# Patient Record
Sex: FEMALE | Race: WHITE | Employment: OTHER | URBAN - METROPOLITAN AREA
[De-identification: names, ages, dates, MRNs, and addresses within clinical notes are randomized per-mention and may not be internally consistent; named-entity substitution may affect disease eponyms.]

---

## 2017-03-01 ENCOUNTER — HOSPITAL ENCOUNTER (OUTPATIENT)
Dept: MAMMOGRAPHY | Age: 62
Discharge: HOME | End: 2017-03-01
Attending: FAMILY MEDICINE
Payer: COMMERCIAL

## 2017-03-01 DIAGNOSIS — Z12.31 VISIT FOR SCREENING MAMMOGRAM: ICD-10-CM

## 2017-03-01 PROCEDURE — 77067 SCR MAMMO BI INCL CAD: CPT

## 2018-03-20 ENCOUNTER — HOSPITAL ENCOUNTER (OUTPATIENT)
Dept: MAMMOGRAPHY | Age: 63
Discharge: HOME | End: 2018-03-20
Attending: FAMILY MEDICINE
Payer: COMMERCIAL

## 2018-03-20 DIAGNOSIS — Z12.39 BREAST SCREENING: ICD-10-CM

## 2018-03-20 PROCEDURE — 77067 SCR MAMMO BI INCL CAD: CPT

## 2018-03-21 ENCOUNTER — HOSPITAL ENCOUNTER (OUTPATIENT)
Dept: MAMMOGRAPHY | Age: 63
Discharge: HOME | End: 2018-03-21
Attending: FAMILY MEDICINE
Payer: COMMERCIAL

## 2018-03-21 DIAGNOSIS — R92.8 ABNORMAL MAMMOGRAM OF LEFT BREAST: ICD-10-CM

## 2018-03-21 PROCEDURE — 77065 DX MAMMO INCL CAD UNI: CPT

## 2018-08-27 ENCOUNTER — OFFICE VISIT (OUTPATIENT)
Dept: INTERNAL MEDICINE CLINIC | Age: 63
End: 2018-08-27

## 2018-08-27 VITALS
BODY MASS INDEX: 23.66 KG/M2 | HEART RATE: 70 BPM | DIASTOLIC BLOOD PRESSURE: 68 MMHG | HEIGHT: 64 IN | TEMPERATURE: 98 F | WEIGHT: 138.6 LBS | SYSTOLIC BLOOD PRESSURE: 134 MMHG | RESPIRATION RATE: 18 BRPM | OXYGEN SATURATION: 97 %

## 2018-08-27 DIAGNOSIS — I10 ESSENTIAL HYPERTENSION: Primary | ICD-10-CM

## 2018-08-27 DIAGNOSIS — M81.0 AGE-RELATED OSTEOPOROSIS WITHOUT CURRENT PATHOLOGICAL FRACTURE: ICD-10-CM

## 2018-08-27 RX ORDER — RISEDRONATE SODIUM 150 MG/1
75 TABLET, FILM COATED ORAL
COMMUNITY
Start: 2018-03-22 | End: 2020-10-28 | Stop reason: SDUPTHER

## 2018-08-27 RX ORDER — ESTRADIOL 0.1 MG/G
CREAM VAGINAL
COMMUNITY
Start: 2018-03-06 | End: 2021-03-18 | Stop reason: ALTCHOICE

## 2018-08-27 RX ORDER — LOSARTAN POTASSIUM 100 MG/1
100 TABLET ORAL DAILY
Qty: 90 TAB | Refills: 3 | Status: SHIPPED | OUTPATIENT
Start: 2018-08-27 | End: 2019-08-05 | Stop reason: SDUPTHER

## 2018-08-27 NOTE — MR AVS SNAPSHOT
[address] [place] [address] [address] [facility] 57 
[phone] Patient: Leo Hermosillo MRN: [id number] CVW:[id number] Visit Information Date & Time Provider Department Dept. Phone Encounter #  
 8/27/2018 10:40 AM Karel Urban MD [facility] [phone] [id number] Upcoming Health Maintenance Date Due Hepatitis C Screening 1955 COLONOSCOPY 7/12/1972 DTaP/Tdap/Td series (1 - Tdap) 7/12/1975 PAP AKA CERVICAL CYTOLOGY 7/12/1975 ZOSTER VACCINE AGE 60> 5/12/2014 Influenza Age 5 to Adult 8/1/2018 BREAST CANCER SCRN MAMMOGRAM 3/21/2020 Allergies as of 8/27/2018  Review Complete On: 8/27/2018 By: Karel Urban MD  
 No Known Allergies Current Immunizations  Never Reviewed No immunizations on file. Not reviewed this visit You Were Diagnosed With   
  
 Codes Comments Essential hypertension    -  Primary ICD-10-CM: I10 
ICD-9-CM: 401.9 Age-related osteoporosis without current pathological fracture     ICD-10-CM: M81.0 ICD-9-CM: 733.01 Vitals BP Pulse Temp Resp Height(growth percentile) Weight(growth percentile) 134/68 (BP 1 Location: Left arm, BP Patient Position: At rest) 70 98 °F (36.7 °C) (Oral) 18 5' 4\" (1.626 m) 138 lb 9.6 oz (62.9 kg) SpO2 BMI OB Status Smoking Status 97% 23.79 kg/m2 Postmenopausal Never Smoker BMI and BSA Data Body Mass Index Body Surface Area  
 23.79 kg/m 2 1.69 m 2 Preferred Pharmacy Pharmacy Name Phone Julia Haywood, [facility] [phone] Your Updated Medication List  
  
   
This list is accurate as of 8/27/18 11:42 AM.  Always use your most recent med list.  
  
  
  
  
 ESTRACE 0.01 % (0.1 mg/gram) vaginal cream  
Generic drug:  estradiol  
  
 losartan 100 mg tablet Commonly known as:  COZAAR Take 1 Tab by mouth daily. risedronate 150 mg tablet Commonly known as:  ACTONEL  
  
 varicella-zoster recombinant (PF) 50 mcg/0.5 mL Susr injection Commonly known as:  SHINGRIX (PF)  
0.5 mL by IntraMUSCular route once for 1 dose. Prescriptions Printed Refills  
 varicella-zoster recombinant, PF, (SHINGRIX, PF,) 50 mcg/0.5 mL susr injection 1 Si.5 mL by IntraMUSCular route once for 1 dose. Class: Print Route: IntraMUSCular Prescriptions Sent to Pharmacy Refills  
 losartan (COZAAR) 100 mg tablet 3 Sig: Take 1 Tab by mouth daily. Class: Normal  
 Pharmacy: [address] [facility], [address] [place] [address] #: [phone] Route: Oral  
  
We Performed the Following LIPID PANEL [07666 CPT(R)] METABOLIC PANEL, COMPREHENSIVE [60837 CPT(R)] Introducing [address] Dear Christy Mak: Thank you for requesting a [employer] account. Our records indicate that you already have an active DataPad account. You can access your account anytime at [email or web address] Did you know that you can access your hospital and ER discharge instructions at any time in [employer]Pad? You can also review all of your test results from your hospital stay or ER visit. Additional Information If you have questions, please visit the Frequently Asked Questions section of the Data[employer] website at [email or web address] Remember, DataPad is NOT to be used for urgent needs. For medical emergencies, dial 911. Now available from your iPhone and Android! Please provide this summary of care documentation to your next provider. Your primary care clinician is listed as RHONA EVANS. If you have any questions after today's visit, please call [phone].

## 2018-08-27 NOTE — PROGRESS NOTES
HISTORY OF PRESENT ILLNESS  Ronnette Kawasaki is a 58 y.o. female. HPI  New patient to our practice. Hx of htn. Has been on meds since . Currently on Valsartan. Hx of osteoporosis. Saw Dr. Johnie Stacy (gyn). Off and on for 3 years but has been taking regularly since . Had DXA in July. UTD with pap and mammogram.    Treated for Hep C with interferon and ribovirin (expiremental treatment). Cured.  battled brain cancer x 14 years.  6 years ago this December. Taking xanax during that time. A lot of DYI. Orthopedic issues secondary. Recent Mortons Neuroma and Trigger fingers. Had colonoscopy completed within last 5 years at [facility]  Failed stress test and had nml cardiac cath by Dr. Meera Henry. Exercises and walks daily. Past Medical History:   Diagnosis Date    Hep C w/o coma, chronic (HCC)     treated    Hypertension     Osteoporosis      Past Surgical History:   Procedure Laterality Date    HX CARPAL TUNNEL RELEASE      x2    HX  SECTION      x3    HX CYST INCISION AND DRAINAGE Left 7984-0109    bemign     Family History   Problem Relation Age of Onset    Diabetes Mother     Heart Disease Mother      CHF    Heart Disease Father     Hypertension Father     Heart Disease Brother     Heart Disease Paternal Uncle     Hypertension Brother      Social History     Social History    Marital status:      Spouse name: N/A    Number of children: N/A    Years of education: N/A     Occupational History    Not on file.      Social History Main Topics    Smoking status: Never Smoker    Smokeless tobacco: Never Used    Alcohol use 4.2 oz/week     7 Glasses of wine per week    Drug use: No    Sexual activity: No     Other Topics Concern    Not on file     Social History Narrative       Current Outpatient Prescriptions:     risedronate (ACTONEL) 150 mg tablet, , Disp: , Rfl:     estradiol (ESTRACE) 0.01 % (0.1 mg/gram) vaginal cream, , Disp: , Rfl:    losartan (COZAAR) 100 mg tablet, Take 1 Tab by mouth daily. , Disp: 90 Tab, Rfl: 3    varicella-zoster recombinant, PF, (SHINGRIX, PF,) 50 mcg/0.5 mL susr injection, 0.5 mL by IntraMUSCular route once for 1 dose., Disp: 0.5 mL, Rfl: 1      Visit Vitals    /68 (BP 1 Location: Left arm, BP Patient Position: At rest)    Pulse 70    Temp 98 °F (36.7 °C) (Oral)    Resp 18    Ht 5' 4\" (1.626 m)    Wt 138 lb 9.6 oz (62.9 kg)    SpO2 97%    BMI 23.79 kg/m2       ROS  See above  Physical Exam   Constitutional: She appears well-developed and well-nourished. HENT:   Head: Normocephalic and atraumatic. Neck: Neck supple. No thyromegaly present. Cardiovascular: Normal rate, regular rhythm and normal heart sounds. Exam reveals no gallop and no friction rub. No murmur heard. Pulmonary/Chest: Effort normal and breath sounds normal.   Abdominal: Soft. Bowel sounds are normal. She exhibits no distension and no mass. There is no tenderness. Musculoskeletal: She exhibits no edema. Lymphadenopathy:     She has no cervical adenopathy. Vitals reviewed. ASSESSMENT and PLAN  htn - controlled. Needs to change from valsartan. Will start losartan. Will monitor at home. Check labs  Osteoporosis - followed by Dr. Sang Morrissey. Continue Alendronate  HM - Shingrix.   Tetanus 2014 in [address] [place] [address] This Encounter    METABOLIC PANEL, COMPREHENSIVE    LIPID PANEL    risedronate (ACTONEL) 150 mg tablet    estradiol (ESTRACE) 0.01 % (0.1 mg/gram) vaginal cream    losartan (COZAAR) 100 mg tablet    varicella-zoster recombinant, PF, (SHINGRIX, PF,) 50 mcg/0.5 mL susr injection     Follow-up Disposition: Not on File

## 2018-09-21 LAB
ALBUMIN SERPL-MCNC: 4.1 G/DL (ref 3.6–4.8)
ALBUMIN/GLOB SERPL: 1.8 {RATIO} (ref 1.2–2.2)
ALP SERPL-CCNC: 46 IU/L (ref 39–117)
ALT SERPL-CCNC: 9 IU/L (ref 0–32)
AST SERPL-CCNC: 14 IU/L (ref 0–40)
BILIRUB SERPL-MCNC: 0.3 MG/DL (ref 0–1.2)
BUN SERPL-MCNC: 12 MG/DL (ref 8–27)
BUN/CREAT SERPL: 21 (ref 12–28)
CALCIUM SERPL-MCNC: 8.8 MG/DL (ref 8.7–10.3)
CHLORIDE SERPL-SCNC: 106 MMOL/L (ref 96–106)
CHOLEST SERPL-MCNC: 237 MG/DL (ref 100–199)
CO2 SERPL-SCNC: 23 MMOL/L (ref 20–29)
CREAT SERPL-MCNC: 0.57 MG/DL (ref 0.57–1)
GLOBULIN SER CALC-MCNC: 2.3 G/DL (ref 1.5–4.5)
GLUCOSE SERPL-MCNC: 100 MG/DL (ref 65–99)
HDLC SERPL-MCNC: 74 MG/DL
INTERPRETATION, 910389: NORMAL
LDLC SERPL CALC-MCNC: 153 MG/DL (ref 0–99)
POTASSIUM SERPL-SCNC: 4.2 MMOL/L (ref 3.5–5.2)
PROT SERPL-MCNC: 6.4 G/DL (ref 6–8.5)
SODIUM SERPL-SCNC: 143 MMOL/L (ref 134–144)
TRIGL SERPL-MCNC: 51 MG/DL (ref 0–149)
VLDLC SERPL CALC-MCNC: 10 MG/DL (ref 5–40)

## 2019-05-16 ENCOUNTER — HOSPITAL ENCOUNTER (OUTPATIENT)
Dept: MAMMOGRAPHY | Age: 64
Discharge: HOME | End: 2019-05-16
Attending: INTERNAL MEDICINE
Payer: COMMERCIAL

## 2019-05-16 DIAGNOSIS — Z12.39 BREAST SCREENING, UNSPECIFIED: ICD-10-CM

## 2019-05-16 PROCEDURE — 77063 BREAST TOMOSYNTHESIS BI: CPT

## 2019-08-27 ENCOUNTER — OFFICE VISIT (OUTPATIENT)
Dept: INTERNAL MEDICINE CLINIC | Age: 64
End: 2019-08-27

## 2019-08-27 VITALS
OXYGEN SATURATION: 98 % | HEIGHT: 64 IN | WEIGHT: 138 LBS | TEMPERATURE: 98.1 F | DIASTOLIC BLOOD PRESSURE: 78 MMHG | SYSTOLIC BLOOD PRESSURE: 130 MMHG | BODY MASS INDEX: 23.56 KG/M2 | HEART RATE: 84 BPM

## 2019-08-27 DIAGNOSIS — I10 ESSENTIAL HYPERTENSION: Primary | ICD-10-CM

## 2019-08-27 DIAGNOSIS — E78.00 PURE HYPERCHOLESTEROLEMIA: ICD-10-CM

## 2019-08-27 RX ORDER — LOSARTAN POTASSIUM 100 MG/1
TABLET ORAL
Qty: 90 TAB | Refills: 3 | Status: SHIPPED | OUTPATIENT
Start: 2019-08-27 | End: 2020-08-14

## 2019-08-27 NOTE — PROGRESS NOTES
Subjective:     Cecy Sawant is a 71 y.o. female who presents for follow up of hypertension. Diet and Lifestyle: generally follows a low sodium diet, exercises regularly  Home BP Monitoring: is well controlled at home, ranging 130's/80's    Cardiovascular ROS: taking medications as instructed, no medication side effects noted, no TIA's, no chest pain on exertion, notes stable dyspnea on exertion, no change, no swelling of ankles, no orthostatic dizziness or lightheadedness, no palpitations. New concerns: none  Continues to see Dr. Johnie Stacy for osteoporosis. Due for repeat DXA next year. Continues on Actonel. .   Intermittent right hip pain. Current Outpatient Medications   Medication Sig Dispense Refill    ibuprofen (MOTRIN PO) Take  by mouth. As needed      losartan (COZAAR) 100 mg tablet TAKE 1 TABLET DAILY 90 Tab 0    risedronate (ACTONEL) 150 mg tablet Take 75 mg by mouth every thirty (30) days.  estradiol (ESTRACE) 0.01 % (0.1 mg/gram) vaginal cream                Review of Systems, additional:  Pertinent items are noted in HPI. Objective:     Visit Vitals  /78   Pulse 84   Temp 98.1 °F (36.7 °C) (Oral)   Ht 5' 4\" (1.626 m)   Wt 138 lb (62.6 kg)   SpO2 98%   BMI 23.69 kg/m²     Appearance: alert, well appearing, and in no distress and oriented to person, place, and time. General exam:   NECK: supple, no lad, no bruit, no tm  LUNGS: cta bilat  CV rrr, no m/g/r  ABD: soft, nt, nd, nabs  EXT: no c/c/e    Assessment/Plan:     hypertension well controlled. current treatment plan is effective, no change in therapy. ? Coverage with losartan  Check labs. Osteoporosis - f/u with Dr. Johnie Stacy. Continue current medications. Orders Placed This Encounter    METABOLIC PANEL, COMPREHENSIVE    LIPID PANEL    ibuprofen (MOTRIN PO)    losartan (COZAAR) 100 mg tablet     Follow-up and Dispositions    · Return in about 6 months (around 2/27/2020) for welcome to medicare.

## 2019-08-27 NOTE — PROGRESS NOTES
Chief Complaint   Patient presents with    Follow-up     yearly visit     Visit Vitals  BP (!) 150/104   Pulse 84   Temp 98.1 °F (36.7 °C) (Oral)   Ht 5' 4\" (1.626 m)   Wt 138 lb (62.6 kg)   SpO2 98%   BMI 23.69 kg/m²     1. Have you been to the ER, urgent care clinic since your last visit? Hospitalized since your last visit? Yes,     2. Have you seen or consulted any other health care providers outside of the [address] [place] [address] since your last visit? Include any pap smears or colon screening.  no

## 2020-02-13 ENCOUNTER — HOSPITAL ENCOUNTER (OUTPATIENT)
Dept: LAB | Age: 65
Discharge: HOME | End: 2020-02-13
Payer: MEDICARE

## 2020-02-13 PROCEDURE — 80061 LIPID PANEL: CPT

## 2020-02-13 PROCEDURE — 36415 COLL VENOUS BLD VENIPUNCTURE: CPT

## 2020-02-13 PROCEDURE — 80053 COMPREHEN METABOLIC PANEL: CPT

## 2020-02-14 LAB
ALBUMIN SERPL-MCNC: 4.4 G/DL (ref 3.8–4.8)
ALBUMIN/GLOB SERPL: 2.1 {RATIO} (ref 1.2–2.2)
ALP SERPL-CCNC: 40 IU/L (ref 39–117)
ALT SERPL-CCNC: 15 IU/L (ref 0–32)
AST SERPL-CCNC: 12 IU/L (ref 0–40)
BILIRUB SERPL-MCNC: 0.3 MG/DL (ref 0–1.2)
BUN SERPL-MCNC: 13 MG/DL (ref 8–27)
BUN/CREAT SERPL: 21 (ref 12–28)
CALCIUM SERPL-MCNC: 9.3 MG/DL (ref 8.7–10.3)
CHLORIDE SERPL-SCNC: 103 MMOL/L (ref 96–106)
CHOLEST SERPL-MCNC: 285 MG/DL (ref 100–199)
CO2 SERPL-SCNC: 25 MMOL/L (ref 20–29)
CREAT SERPL-MCNC: 0.63 MG/DL (ref 0.57–1)
GLOBULIN SER CALC-MCNC: 2.1 G/DL (ref 1.5–4.5)
GLUCOSE SERPL-MCNC: 89 MG/DL (ref 65–99)
HDLC SERPL-MCNC: 93 MG/DL
INTERPRETATION, 910389: NORMAL
LDLC SERPL CALC-MCNC: 177 MG/DL (ref 0–99)
POTASSIUM SERPL-SCNC: 4.6 MMOL/L (ref 3.5–5.2)
PROT SERPL-MCNC: 6.5 G/DL (ref 6–8.5)
SODIUM SERPL-SCNC: 142 MMOL/L (ref 134–144)
TRIGL SERPL-MCNC: 76 MG/DL (ref 0–149)
VLDLC SERPL CALC-MCNC: 15 MG/DL (ref 5–40)

## 2020-02-26 ENCOUNTER — OFFICE VISIT (OUTPATIENT)
Dept: INTERNAL MEDICINE CLINIC | Age: 65
End: 2020-02-26

## 2020-02-26 VITALS
TEMPERATURE: 97.8 F | SYSTOLIC BLOOD PRESSURE: 116 MMHG | WEIGHT: 140 LBS | DIASTOLIC BLOOD PRESSURE: 72 MMHG | HEART RATE: 88 BPM | BODY MASS INDEX: 23.9 KG/M2 | OXYGEN SATURATION: 99 % | HEIGHT: 64 IN

## 2020-02-26 DIAGNOSIS — E78.00 PURE HYPERCHOLESTEROLEMIA: ICD-10-CM

## 2020-02-26 DIAGNOSIS — I10 ESSENTIAL HYPERTENSION: ICD-10-CM

## 2020-02-26 DIAGNOSIS — Z71.89 ADVANCE DIRECTIVE DISCUSSED WITH PATIENT: ICD-10-CM

## 2020-02-26 DIAGNOSIS — M81.0 AGE-RELATED OSTEOPOROSIS WITHOUT CURRENT PATHOLOGICAL FRACTURE: ICD-10-CM

## 2020-02-26 DIAGNOSIS — Z00.00 WELCOME TO MEDICARE PREVENTIVE VISIT: Primary | ICD-10-CM

## 2020-02-26 NOTE — PATIENT INSTRUCTIONS
Medicare Wellness Visit, Female The best way to live healthy is to have a lifestyle where you eat a well-balanced diet, exercise regularly, limit alcohol use, and quit all forms of tobacco/nicotine, if applicable. Regular preventive services are another way to keep healthy. Preventive services (vaccines, screening tests, monitoring & exams) can help personalize your care plan, which helps you manage your own care. Screening tests can find health problems at the earliest stages, when they are easiest to treat. [facility] follows the current, evidence-based guidelines published by the [facility] Gregory Steven ([facility]) when recommending preventive services for our patients. Because we follow these guidelines, sometimes recommendations change over time as research supports it. (For example, mammograms used to be recommended annually. Even though Medicare will still pay for an annual mammogram, the newer guidelines recommend a mammogram every two years for women of average risk). Of course, you and your doctor may decide to screen more often for some diseases, based on your risk and your co-morbidities (chronic disease you are already diagnosed with). Preventive services for you include: - Medicare offers their members a free annual wellness visit, which is time for you and your primary care provider to discuss and plan for your preventive service needs. Take advantage of this benefit every year! 
-All adults over the age of 72 should receive the recommended pneumonia vaccines. Current USPSTF guidelines recommend a series of two vaccines for the best pneumonia protection.  
-All adults should have a flu vaccine yearly and a tetanus vaccine every 10 years.  
-All adults age 48 and older should receive the shingles vaccines (series of two vaccines). -All adults age 38-68 who are overweight should have a diabetes screening test once every three years. -All adults born between 80 and 1965 should be screened once for Hepatitis C. 
-Other screening tests and preventive services for persons with diabetes include: an eye exam to screen for diabetic retinopathy, a kidney function test, a foot exam, and stricter control over your cholesterol.  
-Cardiovascular screening for adults with routine risk involves an electrocardiogram (ECG) at intervals determined by your doctor.  
-Colorectal cancer screenings should be done for adults age 54-65 with no increased risk factors for colorectal cancer. There are a number of acceptable methods of screening for this type of cancer. Each test has its own benefits and drawbacks. Discuss with your doctor what is most appropriate for you during your annual wellness visit. The different tests include: colonoscopy (considered the best screening method), a fecal occult blood test, a fecal DNA test, and sigmoidoscopy. 
 
-A bone mass density test is recommended when a woman turns 65 to screen for osteoporosis. This test is only recommended one time, as a screening. Some providers will use this same test as a disease monitoring tool if you already have osteoporosis. -Breast cancer screenings are recommended every other year for women of normal risk, age 54-69. 
-Cervical cancer screenings for women over age 72 are only recommended with certain risk factors. Here is a list of your current Health Maintenance items (your personalized list of preventive services) with a due date: 
 
 
Health Maintenance Due Topic Date Due  
 Colonoscopy  01/01/2020  Shingrix Vaccine Age 50> (1 of 2) Prescription given  GLAUCOMA SCREENING Q2Y  Due  
 Medicare Yearly Exam  02/26/2021 Medicare Wellness Visit, Female The best way to live healthy is to have a lifestyle where you eat a well-balanced diet, exercise regularly, limit alcohol use, and quit all forms of tobacco/nicotine, if applicable. Regular preventive services are another way to keep healthy. Preventive services (vaccines, screening tests, monitoring & exams) can help personalize your care plan, which helps you manage your own care. Screening tests can find health problems at the earliest stages, when they are easiest to treat. [facility] follows the current, evidence-based guidelines published by the [facility] Gregory Steven ([facility]) when recommending preventive services for our patients. Because we follow these guidelines, sometimes recommendations change over time as research supports it. (For example, mammograms used to be recommended annually. Even though Medicare will still pay for an annual mammogram, the newer guidelines recommend a mammogram every two years for women of average risk). Of course, you and your doctor may decide to screen more often for some diseases, based on your risk and your co-morbidities (chronic disease you are already diagnosed with). Preventive services for you include: - Medicare offers their members a free annual wellness visit, which is time for you and your primary care provider to discuss and plan for your preventive service needs. Take advantage of this benefit every year! 
-All adults over the age of 72 should receive the recommended pneumonia vaccines. Current USPSTF guidelines recommend a series of two vaccines for the best pneumonia protection.  
-All adults should have a flu vaccine yearly and a tetanus vaccine every 10 years.  
-All adults age 48 and older should receive the shingles vaccines (series of two vaccines).      
-All adults age 38-68 who are overweight should have a diabetes screening test once every three years.  
-All adults born between 80 and 1965 should be screened once for Hepatitis C. 
-Other screening tests and preventive services for persons with diabetes include: an eye exam to screen for diabetic retinopathy, a kidney function test, a foot exam, and stricter control over your cholesterol.  
-Cardiovascular screening for adults with routine risk involves an electrocardiogram (ECG) at intervals determined by your doctor.  
-Colorectal cancer screenings should be done for adults age 54-65 with no increased risk factors for colorectal cancer. There are a number of acceptable methods of screening for this type of cancer. Each test has its own benefits and drawbacks. Discuss with your doctor what is most appropriate for you during your annual wellness visit. The different tests include: colonoscopy (considered the best screening method), a fecal occult blood test, a fecal DNA test, and sigmoidoscopy. 
 
-A bone mass density test is recommended when a woman turns 65 to screen for osteoporosis. This test is only recommended one time, as a screening. Some providers will use this same test as a disease monitoring tool if you already have osteoporosis. -Breast cancer screenings are recommended every other year for women of normal risk, age 54-69. 
-Cervical cancer screenings for women over age 72 are only recommended with certain risk factors. Here is a list of your current Health Maintenance items (your personalized list of preventive services) with a due date: 
Health Maintenance Due Topic Date Due  
 Colonoscopy  07/12/1972  Shingles Vaccine (1 of 2) 07/12/2004  Glaucoma Screening   07/12/2019

## 2020-02-26 NOTE — PROGRESS NOTES
This is a \"Welcome to [employer]"  Initial Preventive Physical Examination (IPPE) providing Personalized Prevention Plan Services (Performed in the first 12 months of enrollment)    I have reviewed the patient's medical history in detail and updated the computerized patient record. HTN - BP well controlled at home. Taking losartan regularly. 110/74 last week at home. Dr. Peggye Krabbe at [facility] is following her DXAn for osteoporosis. .  Remains on Actonel. History     Past Medical History:   Diagnosis Date    Hep C w/o coma, chronic (HCC)     treated    Hypertension     Osteoporosis       Past Surgical History:   Procedure Laterality Date    HX CARPAL TUNNEL RELEASE      x2    HX  SECTION      x3    HX CYST INCISION AND DRAINAGE Left 4810-6706    bemign     Current Outpatient Medications   Medication Sig Dispense Refill    varicella-zoster recombinant, PF, (SHINGRIX, PF,) 50 mcg/0.5 mL susr injection 0.5mL by IntraMUSCular route once now and then repeat in 2-6 months 0.5 mL 1    ibuprofen (MOTRIN PO) Take  by mouth. As needed      losartan (COZAAR) 100 mg tablet TAKE 1 TABLET DAILY 90 Tab 3    risedronate (ACTONEL) 150 mg tablet Take 75 mg by mouth every thirty (30) days.  estradiol (ESTRACE) 0.01 % (0.1 mg/gram) vaginal cream        No Known Allergies    Family History   Problem Relation Age of Onset    Diabetes Mother     Heart Disease Mother         CHF    Heart Disease Father     Hypertension Father     Heart Disease Brother     Heart Disease Paternal Uncle     Hypertension Brother      Social History     Tobacco Use    Smoking status: Never Smoker    Smokeless tobacco: Never Used   Substance Use Topics    Alcohol use:  Yes     Alcohol/week: 7.0 standard drinks     Types: 7 Glasses of wine per week       Depression Risk Screen     3 most recent PHQ Screens 2020   Little interest or pleasure in doing things Not at all   Feeling down, depressed, irritable, or hopeless Not at all   Total Score PHQ 2 0       Alcohol Risk Factor Screening:   Do you average 1 drink per night or more than 7 drinks a week:  No    On any one occasion in the past three months have you have had more than 3 drinks containing alcohol:  No      Functional Ability and Level of Safety   Diet: No special diet    Hearing: Hearing is good. Vision Screening:  Vision is good. Using readers. UTD with eye exam.    No exam data present      Activities of Daily Living: The home contains: no safety equipment. Patient does total self care    Ambulation: with no difficulty    Exercise level: moderately active    Fall Risk Screen:  Fall Risk Assessment, last 12 mths 2/26/2020   Able to walk? Yes   Fall in past 12 months? Yes   Fall with injury? Yes   Number of falls in past 12 months 1   Fall Risk Score 2       Abuse Screen:  Patient is not abused     ROS:   Negative except:  Mild aches and pains in her joints. Sees dermatologist bc hx of axillary basal cell ca.       Physical Examination:  Visit Vitals  /72   Pulse 88   Temp 97.8 °F (36.6 °C) (Oral)   Ht 5' 4\" (1.626 m)   Wt 140 lb (63.5 kg)   SpO2 99%   BMI 24.03 kg/m²     General appearance - alert, well appearing, and in no distress and oriented to person, place, and time  Mental status - alert, oriented to person, place, and time  Eyes - pupils equal and reactive, extraocular eye movements intact  Ears - bilateral TM's and external ear canals normal  Nose - normal and patent, no erythema, discharge or polyps  Mouth - mucous membranes moist, pharynx normal without lesions  Neck - supple, no significant adenopathy, carotids upstroke normal bilaterally, no bruits, thyroid exam: thyroid is normal in size without nodules or tenderness  Lymphatics - no palpable lymphadenopathy, no hepatosplenomegaly  Chest - clear to auscultation, no wheezes, rales or rhonchi, symmetric air entry  Heart - normal rate, regular rhythm, normal S1, S2, no murmurs, rubs, clicks or gallops  Abdomen - soft, nontender, nondistended, no masses or organomegaly  bowel sounds normal  Breasts - not examined  Back exam - full range of motion, no tenderness, palpable spasm or pain on motion  Neurological - alert, oriented, normal speech, no focal findings or movement disorder noted  Musculoskeletal - no joint tenderness, deformity or swelling  Extremities - peripheral pulses normal, no pedal edema, no clubbing or cyanosis  Skin - normal coloration and turgor, no rashes, no suspicious skin lesions noted    Screening EKG   EKG order placed: Yes  - NSR    Patient Care Team   Patient Care Team:  Malik Griffin MD as PCP - General (Internal Medicine)  Malik Griffin [facility]     End of Life Planning   Advanced care planning directives were discussed with the patient and /or family/caregiver. Assessment/Plan   Education and counseling provided:  Are appropriate based on today's review and evaluation    Diagnoses and all orders for this visit:    1. Welcome to Medicare preventive visit  -     varicella-zoster recombinant, PF, (SHINGRIX, PF,) 50 mcg/0.5 mL susr injection; 0.5mL by IntraMUSCular route once now and then repeat in 2-6 months    2. Essential hypertension -    Controlled, cont same    3. Pure hypercholesterolemia   Elevated but pt does not want to start statin   Discussed improved diet,  Continue exercise. 4. Age-related osteoporosis without current pathological fracture   Continue Actonel. Fu DXA through Dr. Billy Pardo.      5. Advance directive discussed with patient        Health Maintenance Due   Topic Date Due    Colonoscopy  01/01/2020    Shingrix Vaccine Age 50> (1 of 2) Prescription given    GLAUCOMA SCREENING Q2Y  Due    Medicare Yearly Exam  02/26/2021

## 2020-08-14 RX ORDER — LOSARTAN POTASSIUM 100 MG/1
TABLET ORAL
Qty: 90 TAB | Refills: 3 | Status: SHIPPED | OUTPATIENT
Start: 2020-08-14 | End: 2021-08-09

## 2020-09-29 ENCOUNTER — HOSPITAL ENCOUNTER (OUTPATIENT)
Dept: MAMMOGRAPHY | Age: 65
Discharge: HOME | End: 2020-09-29
Attending: INTERNAL MEDICINE
Payer: MEDICARE

## 2020-09-29 DIAGNOSIS — Z12.31 SCREENING MAMMOGRAM FOR HIGH-RISK PATIENT: ICD-10-CM

## 2020-09-29 PROCEDURE — 77063 BREAST TOMOSYNTHESIS BI: CPT

## 2020-10-26 NOTE — TELEPHONE ENCOUNTER
Patient says her GYN has closed practice she wants to know will you be able to take care of her gyn needs also or will she need to find a new Gyn.

## 2020-10-28 RX ORDER — RISEDRONATE SODIUM 150 MG/1
75 TABLET, FILM COATED ORAL
Qty: 3 TAB | Refills: 3 | Status: SHIPPED | OUTPATIENT
Start: 2020-10-28 | End: 2021-10-13

## 2020-10-28 NOTE — TELEPHONE ENCOUNTER
Patient is aware and verbalized understanding.  Patient also request to know if she need a bone density scan at this time

## 2020-10-28 NOTE — TELEPHONE ENCOUNTER
Yes, I can take over as her gyn.   Let her know if she has had nml paps in the past she no longer needs a pap after the age of 72

## 2021-03-17 NOTE — PROGRESS NOTES
Subjective:     Gabriella Burrell is a 76 y.o. female who presents for follow up of hypertension. Diet and Lifestyle: generally follows a low sodium diet, exercises regularly  Home BP Monitoring: last week was 112/76    Cardiovascular ROS: taking medications as instructed, no medication side effects noted, no TIA's, no chest pain on exertion, no dyspnea on exertion, no swelling of ankles, no orthostatic dizziness or lightheadedness, no palpitations. New concerns:   osteoprosis - on Actonel. Last DXA was 2-3 years ago. Had been followed by her GYN. Current Outpatient Medications   Medication Sig Dispense Refill    risedronate (ACTONEL) 150 mg tablet Take 1 Tab by mouth every thirty (30) days. 3 Tab 3    losartan (COZAAR) 100 mg tablet TAKE 1 TABLET DAILY 90 Tab 3    ibuprofen (MOTRIN PO) Take  by mouth. As needed          Lab Results   Component Value Date/Time    Glucose 89 02/13/2020 11:20 AM    LDL, calculated 177 (H) 02/13/2020 11:20 AM    Creatinine 0.63 02/13/2020 11:20 AM      Lab Results   Component Value Date/Time    Cholesterol, total 285 (H) 02/13/2020 11:20 AM    HDL Cholesterol 93 02/13/2020 11:20 AM    LDL, calculated 177 (H) 02/13/2020 11:20 AM    Triglyceride 76 02/13/2020 11:20 AM     Lab Results   Component Value Date/Time    ALT (SGPT) 15 02/13/2020 11:20 AM    Alk. phosphatase 40 02/13/2020 11:20 AM    Bilirubin, total 0.3 02/13/2020 11:20 AM    Albumin 4.4 02/13/2020 11:20 AM    Protein, total 6.5 02/13/2020 11:20 AM    PLATELET 759 23/80/0505 07:54 AM     Lab Results   Component Value Date/Time    GFR est non-AA 94 02/13/2020 11:20 AM    GFR est  02/13/2020 11:20 AM    Creatinine 0.63 02/13/2020 11:20 AM    BUN 13 02/13/2020 11:20 AM    Sodium 142 02/13/2020 11:20 AM    Potassium 4.6 02/13/2020 11:20 AM    Chloride 103 02/13/2020 11:20 AM    CO2 25 02/13/2020 11:20 AM       Review of Systems, additional:  Pertinent items are noted in HPI.     Objective:     Visit Vitals  BP 130/76   Pulse 85   Temp 97.3 °F (36.3 °C) (Temporal)   Resp 16   Ht 5' 4\" (1.626 m)   Wt 140 lb (63.5 kg)   SpO2 99%   BMI 24.03 kg/m²     Appearance: alert, well appearing, and in no distress and oriented to person, place, and time. General exam:   . NECK: supple, no lad, no bruit, no tm  LUNGS: cta bilat  CV rrr, no m/g/r  ABD: soft, nt, nd, nabs  EXT: no c/c/e. Assessment/Plan:     hypertension well controlled. current treatment plan is effective, no change in therapy. hLD _ on no meds  Repeat labs. Last      Osteoporosis - controlled in past  Repeat DXA  Continue Actonel. Vit d def - repeat levels. No orders of the defined types were placed in this encounter. This is the Subsequent Medicare Annual Wellness Exam, performed 12 months or more after the Initial AWV or the last Subsequent AWV    I have reviewed the patient's medical history in detail and updated the computerized patient record. Depression Risk Factor Screening:     3 most recent PHQ Screens 3/18/2021   Little interest or pleasure in doing things Not at all   Feeling down, depressed, irritable, or hopeless Not at all   Total Score PHQ 2 0       Alcohol Risk Screen    Do you average more than 1 drink per night or more than 7 drinks a week:  No    On any one occasion in the past three months have you have had more than 3 drinks containing alcohol:  No        Functional Ability and Level of Safety:    Hearing: mild but consistent. Activities of Daily Living: The home contains: no safety equipment. Patient does total self care      Ambulation: with no difficulty     Fall Risk:  Fall Risk Assessment, last 12 mths 2/26/2020   Able to walk? Yes   Fall in past 12 months? Yes   Number of falls in past 12 months 1   Fall with injury?  1      Abuse Screen:  Patient is not abused       Cognitive Screening    Has your family/caregiver stated any concerns about your memory: no     Cognitive Screening: Normal - Assessment/Plan   Education and counseling provided:  Are appropriate based on today's review and evaluation   Advanced directive discussed with patient. Diagnoses and all orders for this visit:    1. Essential hypertension  -     METABOLIC PANEL, COMPREHENSIVE; Future  -     LIPID PANEL; Future    2. Pure hypercholesterolemia  -     METABOLIC PANEL, COMPREHENSIVE; Future  -     LIPID PANEL; Future    3. Age-related osteoporosis without current pathological fracture  -     DEXA BONE DENSITY STUDY AXIAL; Future    4. Colon cancer screening  -     REFERRAL TO COLON AND RECTAL SURGERY    5. Polyp of colon, unspecified part of colon, unspecified type  -     REFERRAL TO COLON AND RECTAL SURGERY    6. Vitamin D deficiency  -     VITAMIN D, 25 HYDROXY; Future    7. Medicare annual wellness visit, subsequent    8. Advance directive discussed with patient        Health Maintenance Due     Health Maintenance Due   Topic Date Due    Shingrix Vaccine Age 49> (1 of 2) 1 of 2 completed    Colorectal Cancer Screening Combo  Referral placed    Pneumococcal 65+ years (2 of 2 - PPSV23) Up to date    Medicare Yearly Exam  2022       Patient Care Team   Patient Care Team:  Jarrod Romero MD as PCP - General (Internal Medicine)  Jarrod Romero MD as PCP - [facility] Empaneled Provider    History     Patient Active Problem List   Diagnosis Code    Essential hypertension I10    Age-related osteoporosis without current pathological fracture M81.0     Past Medical History:   Diagnosis Date    Hep C w/o coma, chronic ([facility] [address]     treated    Hypertension     Osteoporosis       Past Surgical History:   Procedure Laterality Date    HX CARPAL TUNNEL RELEASE      x2    HX  SECTION      x3    HX CYST INCISION AND DRAINAGE Left [id number]    bemign     Current Outpatient Medications   Medication Sig Dispense Refill    risedronate (ACTONEL) 150 mg tablet Take 1 Tab by mouth every thirty (30) days.  3 Tab 3    losartan (COZAAR) 100 mg tablet TAKE 1 TABLET DAILY 90 Tab 3    ibuprofen (MOTRIN PO) Take  by mouth. As needed       No Known Allergies    Family History   Problem Relation Age of Onset    Diabetes Mother     Heart Disease Mother         CHF    Heart Disease Father     Hypertension Father     Heart Disease Brother     Heart Disease Paternal Uncle     Hypertension Brother      Social History     Tobacco Use    Smoking status: Never Smoker    Smokeless tobacco: Never Used   Substance Use Topics    Alcohol use:  Yes     Alcohol/week: 7.0 standard drinks     Types: 7 Glasses of wine per week

## 2021-03-18 ENCOUNTER — OFFICE VISIT (OUTPATIENT)
Dept: INTERNAL MEDICINE CLINIC | Age: 66
End: 2021-03-18
Payer: MEDICARE

## 2021-03-18 VITALS
HEART RATE: 85 BPM | OXYGEN SATURATION: 99 % | BODY MASS INDEX: 23.9 KG/M2 | TEMPERATURE: 97.3 F | DIASTOLIC BLOOD PRESSURE: 76 MMHG | HEIGHT: 64 IN | WEIGHT: 140 LBS | SYSTOLIC BLOOD PRESSURE: 130 MMHG | RESPIRATION RATE: 16 BRPM

## 2021-03-18 DIAGNOSIS — Z00.00 MEDICARE ANNUAL WELLNESS VISIT, SUBSEQUENT: ICD-10-CM

## 2021-03-18 DIAGNOSIS — I10 ESSENTIAL HYPERTENSION: Primary | ICD-10-CM

## 2021-03-18 DIAGNOSIS — E55.9 VITAMIN D DEFICIENCY: ICD-10-CM

## 2021-03-18 DIAGNOSIS — E78.00 PURE HYPERCHOLESTEROLEMIA: ICD-10-CM

## 2021-03-18 DIAGNOSIS — Z12.11 COLON CANCER SCREENING: ICD-10-CM

## 2021-03-18 DIAGNOSIS — Z71.89 ADVANCE DIRECTIVE DISCUSSED WITH PATIENT: ICD-10-CM

## 2021-03-18 DIAGNOSIS — K63.5 POLYP OF COLON, UNSPECIFIED PART OF COLON, UNSPECIFIED TYPE: ICD-10-CM

## 2021-03-18 DIAGNOSIS — M81.0 AGE-RELATED OSTEOPOROSIS WITHOUT CURRENT PATHOLOGICAL FRACTURE: ICD-10-CM

## 2021-03-18 PROCEDURE — 3017F COLORECTAL CA SCREEN DOC REV: CPT | Performed by: INTERNAL MEDICINE

## 2021-03-18 PROCEDURE — 1101F PT FALLS ASSESS-DOCD LE1/YR: CPT | Performed by: INTERNAL MEDICINE

## 2021-03-18 PROCEDURE — G0439 PPPS, SUBSEQ VISIT: HCPCS | Performed by: INTERNAL MEDICINE

## 2021-03-18 PROCEDURE — G8752 SYS BP LESS 140: HCPCS | Performed by: INTERNAL MEDICINE

## 2021-03-18 PROCEDURE — G9899 SCRN MAM PERF RSLTS DOC: HCPCS | Performed by: INTERNAL MEDICINE

## 2021-03-18 PROCEDURE — G0463 HOSPITAL OUTPT CLINIC VISIT: HCPCS | Performed by: INTERNAL MEDICINE

## 2021-03-18 PROCEDURE — G8420 CALC BMI NORM PARAMETERS: HCPCS | Performed by: INTERNAL MEDICINE

## 2021-03-18 PROCEDURE — G8427 DOCREV CUR MEDS BY ELIG CLIN: HCPCS | Performed by: INTERNAL MEDICINE

## 2021-03-18 PROCEDURE — 1090F PRES/ABSN URINE INCON ASSESS: CPT | Performed by: INTERNAL MEDICINE

## 2021-03-18 PROCEDURE — G8754 DIAS BP LESS 90: HCPCS | Performed by: INTERNAL MEDICINE

## 2021-03-18 PROCEDURE — G8510 SCR DEP NEG, NO PLAN REQD: HCPCS | Performed by: INTERNAL MEDICINE

## 2021-03-18 PROCEDURE — G8536 NO DOC ELDER MAL SCRN: HCPCS | Performed by: INTERNAL MEDICINE

## 2021-03-18 NOTE — PATIENT INSTRUCTIONS
Medicare Wellness Visit, Female The best way to live healthy is to have a lifestyle where you eat a well-balanced diet, exercise regularly, limit alcohol use, and quit all forms of tobacco/nicotine, if applicable. Regular preventive services are another way to keep healthy. Preventive services (vaccines, screening tests, monitoring & exams) can help personalize your care plan, which helps you manage your own care. Screening tests can find health problems at the earliest stages, when they are easiest to treat. [facility] follows the current, evidence-based guidelines published by the [facility] Gregory Steven ([facility]) when recommending preventive services for our patients. Because we follow these guidelines, sometimes recommendations change over time as research supports it. (For example, mammograms used to be recommended annually. Even though Medicare will still pay for an annual mammogram, the newer guidelines recommend a mammogram every two years for women of average risk). Of course, you and your doctor may decide to screen more often for some diseases, based on your risk and your co-morbidities (chronic disease you are already diagnosed with). Preventive services for you include: - Medicare offers their members a free annual wellness visit, which is time for you and your primary care provider to discuss and plan for your preventive service needs. Take advantage of this benefit every year! 
-All adults over the age of 72 should receive the recommended pneumonia vaccines. Current USPSTF guidelines recommend a series of two vaccines for the best pneumonia protection.  
-All adults should have a flu vaccine yearly and a tetanus vaccine every 10 years.  
-All adults age 48 and older should receive the shingles vaccines (series of two vaccines).      
-All adults age 38-68 who are overweight should have a diabetes screening test once every three years.  
-All adults born between [birth year] and 1965 should be screened once for Hepatitis C. 
-Other screening tests and preventive services for persons with diabetes include: an eye exam to screen for diabetic retinopathy, a kidney function test, a foot exam, and stricter control over your cholesterol.  
-Cardiovascular screening for adults with routine risk involves an electrocardiogram (ECG) at intervals determined by your doctor.  
-Colorectal cancer screenings should be done for adults age 54-65 with no increased risk factors for colorectal cancer. There are a number of acceptable methods of screening for this type of cancer. Each test has its own benefits and drawbacks. Discuss with your doctor what is most appropriate for you during your annual wellness visit. The different tests include: colonoscopy (considered the best screening method), a fecal occult blood test, a fecal DNA test, and sigmoidoscopy. 
 
-A bone mass density test is recommended when a woman turns 65 to screen for osteoporosis. This test is only recommended one time, as a screening. Some providers will use this same test as a disease monitoring tool if you already have osteoporosis. -Breast cancer screenings are recommended every other year for women of normal risk, age 54-69. 
-Cervical cancer screenings for women over age 72 are only recommended with certain risk factors. Here is a list of your current Health Maintenance items (your personalized list of preventive services) with a due date: 
 
 
Health Maintenance Due Topic Date Due  Shingrix Vaccine Age 50> (1 of 2) 1 of 2 completed  Colorectal Cancer Screening Combo  Referral placed  Pneumococcal 65+ years (2 of 2 - PPSV23) Up to date  Medicare Yearly Exam  03/18/2022

## 2021-04-19 ENCOUNTER — HOSPITAL ENCOUNTER (OUTPATIENT)
Dept: MAMMOGRAPHY | Age: 66
Discharge: HOME | End: 2021-04-19
Attending: INTERNAL MEDICINE
Payer: MEDICARE

## 2021-04-19 DIAGNOSIS — M81.0 AGE-RELATED OSTEOPOROSIS WITHOUT CURRENT PATHOLOGICAL FRACTURE: ICD-10-CM

## 2021-04-19 PROCEDURE — 77080 DXA BONE DENSITY AXIAL: CPT

## 2021-08-09 RX ORDER — LOSARTAN POTASSIUM 100 MG/1
TABLET ORAL
Qty: 90 TABLET | Refills: 3 | Status: SHIPPED | OUTPATIENT
Start: 2021-08-09 | End: 2022-08-04

## 2021-10-13 RX ORDER — RISEDRONATE SODIUM 150 MG/1
TABLET, FILM COATED ORAL
Qty: 3 TABLET | Refills: 3 | Status: SHIPPED | OUTPATIENT
Start: 2021-10-13 | End: 2022-10-10

## 2021-10-25 ENCOUNTER — TRANSCRIBE ORDER (OUTPATIENT)
Dept: SCHEDULING | Age: 66
End: 2021-10-25

## 2021-10-25 DIAGNOSIS — Z12.31 ENCOUNTER FOR SCREENING MAMMOGRAM FOR MALIGNANT NEOPLASM OF BREAST: Primary | ICD-10-CM

## 2021-10-25 DIAGNOSIS — Z12.31 ENCOUNTER FOR MAMMOGRAM TO ESTABLISH BASELINE MAMMOGRAM: Primary | ICD-10-CM

## 2021-11-29 ENCOUNTER — HOSPITAL ENCOUNTER (OUTPATIENT)
Dept: MAMMOGRAPHY | Age: 66
Discharge: HOME | End: 2021-11-29
Attending: INTERNAL MEDICINE
Payer: MEDICARE

## 2021-11-29 DIAGNOSIS — Z12.31 ENCOUNTER FOR MAMMOGRAM TO ESTABLISH BASELINE MAMMOGRAM: ICD-10-CM

## 2021-11-29 PROCEDURE — 77067 SCR MAMMO BI INCL CAD: CPT

## 2022-03-19 PROBLEM — M81.0 AGE-RELATED OSTEOPOROSIS WITHOUT CURRENT PATHOLOGICAL FRACTURE: Status: ACTIVE | Noted: 2018-08-27

## 2022-03-19 PROBLEM — I10 ESSENTIAL HYPERTENSION: Status: ACTIVE | Noted: 2018-08-27

## 2022-07-07 ENCOUNTER — OFFICE VISIT (OUTPATIENT)
Dept: INTERNAL MEDICINE CLINIC | Age: 67
End: 2022-07-07
Payer: MEDICARE

## 2022-07-07 VITALS
WEIGHT: 135 LBS | HEIGHT: 64 IN | OXYGEN SATURATION: 99 % | HEART RATE: 87 BPM | DIASTOLIC BLOOD PRESSURE: 68 MMHG | TEMPERATURE: 98.1 F | RESPIRATION RATE: 18 BRPM | BODY MASS INDEX: 23.05 KG/M2 | SYSTOLIC BLOOD PRESSURE: 102 MMHG

## 2022-07-07 DIAGNOSIS — M81.0 AGE-RELATED OSTEOPOROSIS WITHOUT CURRENT PATHOLOGICAL FRACTURE: ICD-10-CM

## 2022-07-07 DIAGNOSIS — Z71.89 ADVANCE DIRECTIVE DISCUSSED WITH PATIENT: ICD-10-CM

## 2022-07-07 DIAGNOSIS — Z00.00 MEDICARE ANNUAL WELLNESS VISIT, SUBSEQUENT: Primary | ICD-10-CM

## 2022-07-07 DIAGNOSIS — Z23 ENCOUNTER FOR IMMUNIZATION: ICD-10-CM

## 2022-07-07 DIAGNOSIS — E55.9 VITAMIN D DEFICIENCY: ICD-10-CM

## 2022-07-07 DIAGNOSIS — I10 ESSENTIAL HYPERTENSION: ICD-10-CM

## 2022-07-07 DIAGNOSIS — E78.00 PURE HYPERCHOLESTEROLEMIA: ICD-10-CM

## 2022-07-07 PROCEDURE — 90732 PPSV23 VACC 2 YRS+ SUBQ/IM: CPT | Performed by: INTERNAL MEDICINE

## 2022-07-07 PROCEDURE — 3017F COLORECTAL CA SCREEN DOC REV: CPT | Performed by: INTERNAL MEDICINE

## 2022-07-07 PROCEDURE — G8754 DIAS BP LESS 90: HCPCS | Performed by: INTERNAL MEDICINE

## 2022-07-07 PROCEDURE — G8536 NO DOC ELDER MAL SCRN: HCPCS | Performed by: INTERNAL MEDICINE

## 2022-07-07 PROCEDURE — G0439 PPPS, SUBSEQ VISIT: HCPCS | Performed by: INTERNAL MEDICINE

## 2022-07-07 PROCEDURE — G8752 SYS BP LESS 140: HCPCS | Performed by: INTERNAL MEDICINE

## 2022-07-07 PROCEDURE — G8510 SCR DEP NEG, NO PLAN REQD: HCPCS | Performed by: INTERNAL MEDICINE

## 2022-07-07 PROCEDURE — G8427 DOCREV CUR MEDS BY ELIG CLIN: HCPCS | Performed by: INTERNAL MEDICINE

## 2022-07-07 PROCEDURE — 1101F PT FALLS ASSESS-DOCD LE1/YR: CPT | Performed by: INTERNAL MEDICINE

## 2022-07-07 PROCEDURE — G8420 CALC BMI NORM PARAMETERS: HCPCS | Performed by: INTERNAL MEDICINE

## 2022-07-07 PROCEDURE — G9899 SCRN MAM PERF RSLTS DOC: HCPCS | Performed by: INTERNAL MEDICINE

## 2022-07-07 NOTE — PROGRESS NOTES
Ivelisse Gallego is a 78 y.o. female    Chief Complaint   Patient presents with    Annual Wellness Visit    Ankle swelling     left ankle       Visit Vitals  /68   Pulse 87   Temp 98.1 °F (36.7 °C) (Temporal)   Resp 18   Ht 5' 4\" (1.626 m)   Wt 135 lb (61.2 kg)   SpO2 99%   BMI 23.17 kg/m²       3 most recent PHQ Screens 7/7/2022   Little interest or pleasure in doing things Not at all   Feeling down, depressed, irritable, or hopeless Not at all   Total Score PHQ 2 0       Fall Risk Assessment, last 12 mths 7/7/2022   Able to walk? Yes   Fall in past 12 months? 0   Number of falls in past 12 months -   Fall with injury? -       Abuse Screening Questionnaire 3/18/2021   Do you ever feel afraid of your partner? N   Are you in a relationship with someone who physically or mentally threatens you? N   Is it safe for you to go home? Y       1. Have you been to the ER, urgent care clinic since your last visit? Hospitalized since your last visit? No     2. Have you seen or consulted any other health care providers outside of the [address] [place] [address] since your last visit? Include any pap smears or colon screening.  No

## 2022-07-07 NOTE — PROGRESS NOTES
This is the Subsequent Medicare Annual Wellness Exam, performed 12 months or more after the Initial AWV or the last Subsequent AWV    I have reviewed the patient's medical history in detail and updated the computerized patient record. Brother passed away 7/4. Helping niece \"do everything\". Diagnosed with severe Aortic Stenosis. Life is very chaotic. Colonoscopy last week with Dr. Brock Bolton - [facility]. No polyps    Last DXA 4/2021. Continues on Alendronate. Had episode of dizziness when walking. BP when returned home was 80/50s. .       ROS:  Negative except mild swelling distal to left lateral malleolus. Visit Vitals  /68   Pulse 87   Temp 98.1 °F (36.7 °C) (Temporal)   Resp 18   Ht 5' 4\" (1.626 m)   Wt 135 lb (61.2 kg)   SpO2 99%   BMI 23.17 kg/m²     PE:  NECK: supple, no lad, no bruit, no tm  LUNGS: cta bilat  CV rrr, no m/g/r  ABD: soft, nt, nd, nabs  EXT: no c/c/e    Assessment/Plan   Education and counseling provided:  Are appropriate based on today's review and evaluation  advanced directive discussed withpatient. 1. Medicare annual wellness visit, subsequent  2. Encounter for immunization  -     PNEUMOCOCCAL, PPSV23, (AGE 2 YRS+), SC/IM  -     ADMIN PNEUMOCOCCAL VACCINE  3. Essential hypertension - ? Over control. Will follow BP at home. If remains low with can decreased losartan to 50mg every day. -     METABOLIC PANEL, COMPREHENSIVE; Future  -     LIPID PANEL; Future  4. Pure hypercholesterolemia  -     METABOLIC PANEL, COMPREHENSIVE; Future  -     LIPID PANEL; Future  5. Age-related osteoporosis without current pathological fracture - Due DXA 4/2023. Continue Residronate. 6. Vitamin D deficiency  -     VITAMIN D, 25 HYDROXY; Future  7.  Advance directive discussed with patient       Depression Risk Factor Screening     3 most recent PHQ Screens 7/7/2022   Little interest or pleasure in doing things Not at all   Feeling down, depressed, irritable, or hopeless Not at all Total Score PHQ 2 0       Alcohol & Drug Abuse Risk Screen    Do you average more than 1 drink per night or more than 7 drinks a week:  No    On any one occasion in the past three months have you have had more than 3 drinks containing alcohol:  No          Functional Ability and Level of Safety    Hearing: Hearing is good. Activities of Daily Living: The home contains: no safety equipment. Patient does total self care      Ambulation: with no difficulty     Fall Risk:  Fall Risk Assessment, last 12 mths 2022   Able to walk? Yes   Fall in past 12 months? 0   Number of falls in past 12 months -   Fall with injury?  -      Abuse Screen:  Patient is not abused       Cognitive Screening    Has your family/caregiver stated any concerns about your memory: no     Cognitive Screening: Normal -      Health Maintenance Due     Health Maintenance Due   Topic Date Due    Shingrix Vaccine Age 49> (1 of 2) Up to date    Colorectal Cancer Screening Combo  Up to date    Pneumococcal 65+ years (2 - PPSV23 or PCV20) Given today    COVID-19 Vaccine (2 - Moderna 3-dose series) Up to date    Depression Screen  Completed today    Medicare Yearly Exam  2023       Patient Care Team   Patient Care Team:  Vikas Osborne MD as PCP - General (Internal Medicine Physician)  Vikas Osborne [facility]    History     Patient Active Problem List   Diagnosis Code    Essential hypertension I10    Age-related osteoporosis without current pathological fracture M81.0     Past Medical History:   Diagnosis Date    Fracture     Left humerus, left 3rd finger--fall and dog leash pulled her finger    Hep C w/o coma, chronic ([address]     treated    Hypertension     Osteoporosis       Past Surgical History:   Procedure Laterality Date    HX CARPAL TUNNEL RELEASE      x2    HX  SECTION      x3    HX CYST INCISION AND DRAINAGE Left [id number]    bemign     Current Outpatient Medications Medication Sig Dispense Refill    risedronate (ACTONEL) 150 mg tablet TAKE 1 TABLET EVERY 30 DAYS 3 Tablet 3    losartan (COZAAR) 100 mg tablet TAKE 1 TABLET DAILY 90 Tablet 3    ibuprofen (MOTRIN PO) Take  by mouth. As needed       No Known Allergies    Family History   Problem Relation Age of Onset    Diabetes Mother     Heart Disease Mother         CHF    Broken Bones Mother         Hip and numerus other bones    Heart Disease Father     Hypertension Father     Heart Disease Brother     Heart Disease Paternal Uncle     Hypertension Brother     Heart Disease Brother         severe aortic stenosis     Social History     Tobacco Use    Smoking status: Never Smoker    Smokeless tobacco: Never Used   Substance Use Topics    Alcohol use:  Yes     Alcohol/week: 7.0 standard drinks     Types: 7 Glasses of wine per week         Ney Carnes MD

## 2022-07-07 NOTE — PATIENT INSTRUCTIONS
Medicare Wellness Visit, Female     The best way to live healthy is to have a lifestyle where you eat a well-balanced diet, exercise regularly, limit alcohol use, and quit all forms of tobacco/nicotine, if applicable. Regular preventive services are another way to keep healthy. Preventive services (vaccines, screening tests, monitoring & exams) can help personalize your care plan, which helps you manage your own care. Screening tests can find health problems at the earliest stages, when they are easiest to treat. [facility] follows the current, evidence-based guidelines published by the [facility] Gregory Steven ([facility]) when recommending preventive services for our patients. Because we follow these guidelines, sometimes recommendations change over time as research supports it. (For example, mammograms used to be recommended annually. Even though Medicare will still pay for an annual mammogram, the newer guidelines recommend a mammogram every two years for women of average risk). Of course, you and your doctor may decide to screen more often for some diseases, based on your risk and your co-morbidities (chronic disease you are already diagnosed with). Preventive services for you include:  - Medicare offers their members a free annual wellness visit, which is time for you and your primary care provider to discuss and plan for your preventive service needs. Take advantage of this benefit every year!  -All adults over the age of 72 should receive the recommended pneumonia vaccines. Current USPSTF guidelines recommend a series of two vaccines for the best pneumonia protection.   -All adults should have a flu vaccine yearly and a tetanus vaccine every 10 years.   -All adults age 48 and older should receive the shingles vaccines (series of two vaccines).       -All adults age 38-68 who are overweight should have a diabetes screening test once every three years.   -All adults born between 80 and 1965 should be screened once for Hepatitis C.  -Other screening tests and preventive services for persons with diabetes include: an eye exam to screen for diabetic retinopathy, a kidney function test, a foot exam, and stricter control over your cholesterol.   -Cardiovascular screening for adults with routine risk involves an electrocardiogram (ECG) at intervals determined by your doctor.   -Colorectal cancer screenings should be done for adults age 54-65 with no increased risk factors for colorectal cancer. There are a number of acceptable methods of screening for this type of cancer. Each test has its own benefits and drawbacks. Discuss with your doctor what is most appropriate for you during your annual wellness visit. The different tests include: colonoscopy (considered the best screening method), a fecal occult blood test, a fecal DNA test, and sigmoidoscopy.    -A bone mass density test is recommended when a woman turns 65 to screen for osteoporosis. This test is only recommended one time, as a screening. Some providers will use this same test as a disease monitoring tool if you already have osteoporosis. -Breast cancer screenings are recommended every other year for women of normal risk, age 54-69.  -Cervical cancer screenings for women over age 72 are only recommended with certain risk factors.      Here is a list of your current Health Maintenance items (your personalized list of preventive services) with a due date:      Health Maintenance Due   Topic Date Due    Shingrix Vaccine Age 50> (1 of 2) Up to date    Colorectal Cancer Screening Combo  Up to date    Pneumococcal 65+ years (2 - PPSV23 or PCV20) Given today    COVID-19 Vaccine (2 - Moderna 3-dose series) Up to date    Depression Screen  Completed today    Medicare Yearly Exam  07/07/2023

## 2022-08-04 RX ORDER — LOSARTAN POTASSIUM 100 MG/1
TABLET ORAL
Qty: 90 TABLET | Refills: 3 | Status: SHIPPED | OUTPATIENT
Start: 2022-08-04

## 2022-10-10 RX ORDER — RISEDRONATE SODIUM 150 MG/1
TABLET, FILM COATED ORAL
Qty: 3 TABLET | Refills: 3 | Status: SHIPPED | OUTPATIENT
Start: 2022-10-10

## 2022-11-17 ENCOUNTER — TRANSCRIBE ORDER (OUTPATIENT)
Dept: SCHEDULING | Age: 67
End: 2022-11-17

## 2022-11-17 DIAGNOSIS — Z12.31 SCREENING MAMMOGRAM FOR HIGH-RISK PATIENT: Primary | ICD-10-CM

## 2022-12-15 ENCOUNTER — HOSPITAL ENCOUNTER (OUTPATIENT)
Dept: MAMMOGRAPHY | Age: 67
Discharge: HOME | End: 2022-12-15
Attending: INTERNAL MEDICINE
Payer: MEDICARE

## 2022-12-15 DIAGNOSIS — Z12.31 SCREENING MAMMOGRAM FOR HIGH-RISK PATIENT: ICD-10-CM

## 2022-12-15 PROCEDURE — 77063 BREAST TOMOSYNTHESIS BI: CPT

## 2023-07-05 ENCOUNTER — TELEPHONE (OUTPATIENT)
Age: 68
End: 2023-07-05

## 2023-07-05 DIAGNOSIS — E55.9 VITAMIN D DEFICIENCY, UNSPECIFIED: ICD-10-CM

## 2023-07-05 DIAGNOSIS — E78.00 PURE HYPERCHOLESTEROLEMIA, UNSPECIFIED: ICD-10-CM

## 2023-07-05 DIAGNOSIS — I10 ESSENTIAL HYPERTENSION: Primary | ICD-10-CM

## 2023-07-05 NOTE — TELEPHONE ENCOUNTER
----- Message from Gautam Darlene sent at 7/3/2023  3:46 PM EDT -----  Subject: Referral Request    Reason for referral request? pt. has upcoming Medicare AWV on 7/17/2023   please, place any orders needed for appt please ,call pt. once orders have   been placed so she can schedule to have them done thank you   Provider patient wants to be referred to(if known):     Provider Phone Number(if known):     Additional Information for Provider?   ---------------------------------------------------------------------------  --------------  [address]    [phone]; OK to leave message on voicemail  ---------------------------------------------------------------------------  --------------

## 2023-07-05 NOTE — TELEPHONE ENCOUNTER
Called patient to let her know that labs has been sent to [facility] labs and she must be fasting

## 2023-07-05 NOTE — TELEPHONE ENCOUNTER
Please let her know labs ordered for [facility] labs (next to our office or [address]). Fasting please.

## 2023-07-31 RX ORDER — LOSARTAN POTASSIUM 100 MG/1
TABLET ORAL
Qty: 90 TABLET | Refills: 1 | Status: SHIPPED | OUTPATIENT
Start: 2023-07-31

## 2023-10-03 RX ORDER — RISEDRONATE SODIUM 150 MG/1
TABLET, FILM COATED ORAL
Qty: 3 TABLET | Refills: 1 | Status: SHIPPED | OUTPATIENT
Start: 2023-10-03

## 2023-10-12 RX ORDER — LOSARTAN POTASSIUM 100 MG/1
100 TABLET ORAL DAILY
Qty: 90 TABLET | Refills: 0 | Status: SHIPPED | OUTPATIENT
Start: 2023-10-12 | End: 2024-01-10

## 2023-10-12 NOTE — TELEPHONE ENCOUNTER
Refill request received from [facility] for   Requested Prescriptions     Pending Prescriptions Disp Refills    losartan (COZAAR) 100 MG tablet 90 tablet 0     Sig: Take 1 tablet by mouth daily     7/7/2022   Visit date not found     Routed to Dr Bhavya Amaro for review.

## 2023-12-27 ENCOUNTER — OFFICE VISIT (OUTPATIENT)
Age: 68
End: 2023-12-27
Payer: MEDICARE

## 2023-12-27 VITALS
DIASTOLIC BLOOD PRESSURE: 88 MMHG | OXYGEN SATURATION: 100 % | SYSTOLIC BLOOD PRESSURE: 134 MMHG | TEMPERATURE: 97.4 F | HEART RATE: 83 BPM | RESPIRATION RATE: 14 BRPM | WEIGHT: 141 LBS | BODY MASS INDEX: 24.07 KG/M2 | HEIGHT: 64 IN

## 2023-12-27 DIAGNOSIS — I10 ESSENTIAL HYPERTENSION: ICD-10-CM

## 2023-12-27 DIAGNOSIS — I10 ESSENTIAL HYPERTENSION: Primary | ICD-10-CM

## 2023-12-27 DIAGNOSIS — M81.0 AGE-RELATED OSTEOPOROSIS WITHOUT CURRENT PATHOLOGICAL FRACTURE: ICD-10-CM

## 2023-12-27 DIAGNOSIS — E78.00 PURE HYPERCHOLESTEROLEMIA, UNSPECIFIED: ICD-10-CM

## 2023-12-27 DIAGNOSIS — Z78.0 ASYMPTOMATIC MENOPAUSAL STATE: ICD-10-CM

## 2023-12-27 DIAGNOSIS — Z12.31 ENCOUNTER FOR SCREENING MAMMOGRAM FOR MALIGNANT NEOPLASM OF BREAST: ICD-10-CM

## 2023-12-27 PROCEDURE — 3079F DIAST BP 80-89 MM HG: CPT | Performed by: PHYSICIAN ASSISTANT

## 2023-12-27 PROCEDURE — 1123F ACP DISCUSS/DSCN MKR DOCD: CPT | Performed by: PHYSICIAN ASSISTANT

## 2023-12-27 PROCEDURE — 3075F SYST BP GE 130 - 139MM HG: CPT | Performed by: PHYSICIAN ASSISTANT

## 2023-12-27 PROCEDURE — 99214 OFFICE O/P EST MOD 30 MIN: CPT | Performed by: PHYSICIAN ASSISTANT

## 2023-12-27 RX ORDER — TRETINOIN 0.5 MG/G
CREAM TOPICAL
COMMUNITY
Start: 2023-11-29

## 2023-12-27 RX ORDER — LOSARTAN POTASSIUM 100 MG/1
100 TABLET ORAL DAILY
Qty: 90 TABLET | Refills: 0 | Status: SHIPPED | OUTPATIENT
Start: 2023-12-27 | End: 2024-03-26

## 2024-01-03 ENCOUNTER — OFFICE VISIT (OUTPATIENT)
Age: 69
End: 2024-01-03
Payer: MEDICARE

## 2024-01-03 VITALS
HEART RATE: 95 BPM | SYSTOLIC BLOOD PRESSURE: 120 MMHG | OXYGEN SATURATION: 98 % | DIASTOLIC BLOOD PRESSURE: 70 MMHG | WEIGHT: 142 LBS | BODY MASS INDEX: 24.37 KG/M2 | RESPIRATION RATE: 18 BRPM | TEMPERATURE: 98 F

## 2024-01-03 DIAGNOSIS — I10 ESSENTIAL HYPERTENSION: ICD-10-CM

## 2024-01-03 DIAGNOSIS — E55.9 VITAMIN D DEFICIENCY, UNSPECIFIED: ICD-10-CM

## 2024-01-03 DIAGNOSIS — R39.9 UTI SYMPTOMS: ICD-10-CM

## 2024-01-03 DIAGNOSIS — E78.00 PURE HYPERCHOLESTEROLEMIA, UNSPECIFIED: ICD-10-CM

## 2024-01-03 DIAGNOSIS — M81.0 AGE-RELATED OSTEOPOROSIS WITHOUT CURRENT PATHOLOGICAL FRACTURE: ICD-10-CM

## 2024-01-03 DIAGNOSIS — N30.01 ACUTE CYSTITIS WITH HEMATURIA: ICD-10-CM

## 2024-01-03 DIAGNOSIS — N30.01 ACUTE CYSTITIS WITH HEMATURIA: Primary | ICD-10-CM

## 2024-01-03 LAB
BILIRUBIN, URINE, POC: NEGATIVE
BLOOD URINE, POC: ABNORMAL
GLUCOSE URINE, POC: NEGATIVE
KETONES, URINE, POC: NEGATIVE
LEUKOCYTE ESTERASE, URINE, POC: ABNORMAL
NITRITE, URINE, POC: NEGATIVE
PH, URINE, POC: 6 (ref 4.6–8)
PROTEIN,URINE, POC: NEGATIVE
SPECIFIC GRAVITY, URINE, POC: 1.01 (ref 1–1.03)
URINALYSIS CLARITY, POC: CLEAR
URINALYSIS COLOR, POC: YELLOW
UROBILINOGEN, POC: ABNORMAL

## 2024-01-03 PROCEDURE — 99214 OFFICE O/P EST MOD 30 MIN: CPT | Performed by: PHYSICIAN ASSISTANT

## 2024-01-03 PROCEDURE — 81001 URINALYSIS AUTO W/SCOPE: CPT | Performed by: PHYSICIAN ASSISTANT

## 2024-01-03 PROCEDURE — 1123F ACP DISCUSS/DSCN MKR DOCD: CPT | Performed by: PHYSICIAN ASSISTANT

## 2024-01-03 PROCEDURE — PBSHW AMB POC URINALYSIS DIP STICK AUTO W/ MICRO: Performed by: PHYSICIAN ASSISTANT

## 2024-01-03 PROCEDURE — 3078F DIAST BP <80 MM HG: CPT | Performed by: PHYSICIAN ASSISTANT

## 2024-01-03 PROCEDURE — 3074F SYST BP LT 130 MM HG: CPT | Performed by: PHYSICIAN ASSISTANT

## 2024-01-03 RX ORDER — NITROFURANTOIN 25; 75 MG/1; MG/1
100 CAPSULE ORAL 2 TIMES DAILY
Qty: 20 CAPSULE | Refills: 0 | Status: SHIPPED | OUTPATIENT
Start: 2024-01-03 | End: 2024-01-13

## 2024-01-03 ASSESSMENT — ENCOUNTER SYMPTOMS
SHORTNESS OF BREATH: 0
COUGH: 0

## 2024-01-03 ASSESSMENT — PATIENT HEALTH QUESTIONNAIRE - PHQ9
2. FEELING DOWN, DEPRESSED OR HOPELESS: 0
SUM OF ALL RESPONSES TO PHQ QUESTIONS 1-9: 0
SUM OF ALL RESPONSES TO PHQ QUESTIONS 1-9: 0
SUM OF ALL RESPONSES TO PHQ9 QUESTIONS 1 & 2: 0
SUM OF ALL RESPONSES TO PHQ QUESTIONS 1-9: 0
SUM OF ALL RESPONSES TO PHQ QUESTIONS 1-9: 0
1. LITTLE INTEREST OR PLEASURE IN DOING THINGS: 0

## 2024-01-03 NOTE — PROGRESS NOTES
I have reviewed all needed documentation in preparation for visit. Verified patient by name and date of birth  Chief Complaint   Patient presents with    Urinary Tract Infection     UTI, Symptoms started last Friday, patient has frequent urination.        There were no vitals filed for this visit.    Health Maintenance Due   Topic Date Due    Shingles vaccine (1 of 2) Never done    Respiratory Syncytial Virus (RSV) Pregnant or age 60 yrs+ (1 - 1-dose 60+ series) Never done    Flu vaccine (1) 08/01/2023    COVID-19 Vaccine (5 - 2023-24 season) 09/01/2023    Annual Wellness Visit (Medicare)  Never done    DTaP/Tdap/Td vaccine (2 - Td or Tdap) 01/01/2024       1. \"Have you been to the ER, urgent care clinic since your last visit?  Hospitalized since your last visit?\" No    2. \"Have you seen or consulted any other health care providers outside of the [facility] since your last visit?\" No    3. For patients aged 45-75: Has the patient had a colonoscopy / FIT/ Cologuard? Yes, up to date.       If the patient is female:    4. For patients aged 40-74: Has the patient had a mammogram within the past 2 years? Yes, up to date.       5. For patients aged 21-65: Has the patient had a pap smear? ANNA Helton

## 2024-01-03 NOTE — PROGRESS NOTES
Fiorella Groves is a 68 y.o. female  Chief Complaint   Patient presents with    Urinary Tract Infection     UTI, Symptoms started last Friday, patient has frequent urination.      Vitals:    01/03/24 1510   BP: (!) 141/82   Pulse: 95   Resp: 18   Temp:    SpO2: 98%      Wt Readings from Last 3 Encounters:   01/03/24 64.4 kg (142 lb)   12/27/23 64 kg (141 lb)   07/07/22 61.2 kg (135 lb)     BMI Readings from Last 3 Encounters:   01/03/24 24.37 kg/m²   12/27/23 24.20 kg/m²   07/07/22 23.17 kg/m²     Health Maintenance Due   Topic Date Due    Shingles vaccine (1 of 2) Never done    Respiratory Syncytial Virus (RSV) Pregnant or age 60 yrs+ (1 - 1-dose 60+ series) Never done    Flu vaccine (1) 08/01/2023    COVID-19 Vaccine (5 - 2023-24 season) 09/01/2023    Annual Wellness Visit (Medicare)  Never done    DTaP/Tdap/Td vaccine (2 - Td or Tdap) 01/01/2024     HPI  UTI sx x 6 days. Not improving with hydration.     CV follow up  BP controlled:  BP Readings from Last 3 Encounters:   01/03/24 120/70   12/27/23 134/88   07/07/22 102/68     Currently taking:   Key Anti-Hypertensive Meds            losartan (COZAAR) 100 MG tablet (Taking)    Sig - Route: Take 1 tablet by mouth daily - Oral            Creatinine (MG/DL)   Date Value   03/18/2021 0.65      ROS  Review of Systems   Constitutional:  Negative for fever.   Respiratory:  Negative for cough and shortness of breath.    Cardiovascular:  Negative for chest pain and palpitations.   Neurological:  Negative for headaches.   Psychiatric/Behavioral:  Negative for dysphoric mood.      EXAM  Physical Exam  Vitals and nursing note reviewed.   Constitutional:       Appearance: Normal appearance.   HENT:      Head: Normocephalic and atraumatic.   Neck:      Vascular: No carotid bruit.   Cardiovascular:      Rate and Rhythm: Normal rate and regular rhythm.      Heart sounds: Normal heart sounds.   Pulmonary:      Effort: Pulmonary effort is normal. No respiratory distress.      Breath

## 2024-01-06 LAB
BACTERIA SPEC CULT: ABNORMAL
CC UR VC: ABNORMAL
SERVICE CMNT-IMP: ABNORMAL

## 2024-01-26 ENCOUNTER — HOSPITAL ENCOUNTER (OUTPATIENT)
Age: 69
End: 2024-01-26
Payer: MEDICARE

## 2024-01-26 VITALS — WEIGHT: 145 LBS | BODY MASS INDEX: 24.75 KG/M2 | HEIGHT: 64 IN

## 2024-01-26 DIAGNOSIS — Z12.31 ENCOUNTER FOR SCREENING MAMMOGRAM FOR MALIGNANT NEOPLASM OF BREAST: ICD-10-CM

## 2024-01-26 DIAGNOSIS — Z78.0 ASYMPTOMATIC MENOPAUSAL STATE: ICD-10-CM

## 2024-01-26 PROCEDURE — 77063 BREAST TOMOSYNTHESIS BI: CPT

## 2024-01-26 PROCEDURE — 77080 DXA BONE DENSITY AXIAL: CPT

## 2024-01-29 DIAGNOSIS — M81.0 AGE-RELATED OSTEOPOROSIS WITHOUT CURRENT PATHOLOGICAL FRACTURE: Primary | ICD-10-CM

## 2024-01-29 RX ORDER — PSEUDOEPHEDRINE HCL 30 MG
500 TABLET ORAL DAILY
COMMUNITY
Start: 2024-01-29

## 2024-03-06 ENCOUNTER — OFFICE VISIT (OUTPATIENT)
Age: 69
End: 2024-03-06
Payer: COMMERCIAL

## 2024-03-06 VITALS
DIASTOLIC BLOOD PRESSURE: 83 MMHG | HEART RATE: 93 BPM | SYSTOLIC BLOOD PRESSURE: 138 MMHG | RESPIRATION RATE: 18 BRPM | WEIGHT: 144.4 LBS | TEMPERATURE: 98.1 F | BODY MASS INDEX: 24.79 KG/M2

## 2024-03-06 DIAGNOSIS — H61.21 IMPACTED CERUMEN OF RIGHT EAR: Primary | ICD-10-CM

## 2024-03-06 DIAGNOSIS — J30.9 ALLERGIC RHINITIS, UNSPECIFIED SEASONALITY, UNSPECIFIED TRIGGER: ICD-10-CM

## 2024-03-06 PROCEDURE — 3075F SYST BP GE 130 - 139MM HG: CPT | Performed by: PHYSICIAN ASSISTANT

## 2024-03-06 PROCEDURE — 1123F ACP DISCUSS/DSCN MKR DOCD: CPT | Performed by: PHYSICIAN ASSISTANT

## 2024-03-06 PROCEDURE — 99214 OFFICE O/P EST MOD 30 MIN: CPT | Performed by: PHYSICIAN ASSISTANT

## 2024-03-06 PROCEDURE — 3079F DIAST BP 80-89 MM HG: CPT | Performed by: PHYSICIAN ASSISTANT

## 2024-03-06 PROCEDURE — G2211 COMPLEX E/M VISIT ADD ON: HCPCS | Performed by: PHYSICIAN ASSISTANT

## 2024-03-06 ASSESSMENT — ENCOUNTER SYMPTOMS
SHORTNESS OF BREATH: 0
SORE THROAT: 0
COUGH: 0
TROUBLE SWALLOWING: 0
SINUS PRESSURE: 1

## 2024-03-06 NOTE — PROGRESS NOTES
I have reviewed all needed documentation in preparation for visit. Verified patient by name and date of birth  Chief Complaint   Patient presents with    Dizziness    Ear Fullness     Right ear is blocked for the last two weeks, experiencing dizziness for the last three mornings about an hour after she is up. Post nasal drip.        Vitals:    03/06/24 1456   BP: 138/83   Site: Left Upper Arm   Position: Sitting   Cuff Size: Large Adult   Pulse: 93   Resp: 18   Temp: 98.1 °F (36.7 °C)   TempSrc: Temporal   Weight: 65.5 kg (144 lb 6.4 oz)       Health Maintenance Due   Topic Date Due    Shingles vaccine (1 of 2) Never done    Respiratory Syncytial Virus (RSV) Pregnant or age 60 yrs+ (1 - 1-dose 60+ series) Never done    COVID-19 Vaccine (5 - 2023-24 season) 09/01/2023    DTaP/Tdap/Td vaccine (2 - Td or Tdap) 01/01/2024    Annual Wellness Visit (Medicare Advantage)  Never done       1. \"Have you been to the ER, urgent care clinic since your last visit?  Hospitalized since your last visit?\" No    2. \"Have you seen or consulted any other health care providers outside of the [facility] since your last visit?\" No    3. For patients aged 45-75: Has the patient had a colonoscopy / FIT/ Cologuard? Yes, up to date.      If the patient is female:    4. For patients aged 40-74: Has the patient had a mammogram within the past 2 years? Yes, up to date.       5. For patients aged 21-65: Has the patient had a pap smear? ANNA Helton   
normal. There is impacted cerumen.      Left Ear: Ear canal and external ear normal.      Ears:      Comments: Bilateral TMs with fluid. No erythema.     Nose: Congestion and rhinorrhea present.      Comments: Nasal mucosa pale, inflamed.        Mouth/Throat:      Mouth: Mucous membranes are moist.      Pharynx: Oropharynx is clear. No oropharyngeal exudate.   Eyes:      Extraocular Movements: Extraocular movements intact.      Conjunctiva/sclera: Conjunctivae normal.      Pupils: Pupils are equal, round, and reactive to light.   Cardiovascular:      Rate and Rhythm: Normal rate and regular rhythm.      Heart sounds: Normal heart sounds.   Pulmonary:      Effort: Pulmonary effort is normal. No respiratory distress.      Breath sounds: Normal breath sounds.   Musculoskeletal:      Cervical back: Normal range of motion and neck supple. No rigidity.   Lymphadenopathy:      Cervical: No cervical adenopathy.   Skin:     General: Skin is warm and dry.      Findings: No rash.   Neurological:      General: No focal deficit present.      Mental Status: She is alert and oriented to person, place, and time.   Psychiatric:         Mood and Affect: Mood normal.         Behavior: Behavior normal.         Thought Content: Thought content normal.         Judgment: Judgment normal.       ASSESSMENT/PLAN    ICD-10-CM    1. Impacted cerumen of right ear  H61.21       2. Allergic rhinitis, unspecified seasonality, unspecified trigger  J30.9       Debrox daily. INI 5 days, schedule appointment for [facility].   Start allergy meds OTC as usual for spring allergies.

## 2024-03-07 LAB
25(OH)D3+25(OH)D2 SERPL-MCNC: 20.3 NG/ML (ref 30–100)
ALBUMIN SERPL-MCNC: 4.3 G/DL (ref 3.9–4.9)
ALBUMIN/GLOB SERPL: 2.2 {RATIO} (ref 1.2–2.2)
ALP SERPL-CCNC: 48 IU/L (ref 44–121)
ALT SERPL-CCNC: 14 IU/L (ref 0–32)
AST SERPL-CCNC: 17 IU/L (ref 0–40)
BILIRUB SERPL-MCNC: 0.4 MG/DL (ref 0–1.2)
BUN SERPL-MCNC: 15 MG/DL (ref 8–27)
BUN/CREAT SERPL: 22 (ref 12–28)
CALCIUM SERPL-MCNC: 8.8 MG/DL (ref 8.7–10.3)
CHLORIDE SERPL-SCNC: 103 MMOL/L (ref 96–106)
CHOLEST SERPL-MCNC: 239 MG/DL (ref 100–199)
CO2 SERPL-SCNC: 25 MMOL/L (ref 20–29)
CREAT SERPL-MCNC: 0.69 MG/DL (ref 0.57–1)
EGFRCR SERPLBLD CKD-EPI 2021: 94 ML/MIN/1.73
GLOBULIN SER CALC-MCNC: 2 G/DL (ref 1.5–4.5)
GLUCOSE SERPL-MCNC: 118 MG/DL (ref 70–99)
HDLC SERPL-MCNC: 83 MG/DL
LDLC SERPL CALC-MCNC: 143 MG/DL (ref 0–99)
POTASSIUM SERPL-SCNC: 4.2 MMOL/L (ref 3.5–5.2)
PROT SERPL-MCNC: 6.3 G/DL (ref 6–8.5)
SODIUM SERPL-SCNC: 140 MMOL/L (ref 134–144)
TRIGL SERPL-MCNC: 78 MG/DL (ref 0–149)
VLDLC SERPL CALC-MCNC: 13 MG/DL (ref 5–40)

## 2024-03-13 ENCOUNTER — TELEPHONE (OUTPATIENT)
Age: 69
End: 2024-03-13

## 2024-03-13 ENCOUNTER — OFFICE VISIT (OUTPATIENT)
Age: 69
End: 2024-03-13
Payer: COMMERCIAL

## 2024-03-13 VITALS
HEART RATE: 93 BPM | SYSTOLIC BLOOD PRESSURE: 117 MMHG | OXYGEN SATURATION: 97 % | RESPIRATION RATE: 18 BRPM | BODY MASS INDEX: 24.41 KG/M2 | HEIGHT: 64 IN | WEIGHT: 143 LBS | TEMPERATURE: 98.2 F | DIASTOLIC BLOOD PRESSURE: 74 MMHG

## 2024-03-13 DIAGNOSIS — H61.21 IMPACTED CERUMEN OF RIGHT EAR: Primary | ICD-10-CM

## 2024-03-13 DIAGNOSIS — J30.9 ALLERGIC RHINITIS, UNSPECIFIED SEASONALITY, UNSPECIFIED TRIGGER: ICD-10-CM

## 2024-03-13 PROCEDURE — 69209 REMOVE IMPACTED EAR WAX UNI: CPT | Performed by: PHYSICIAN ASSISTANT

## 2024-03-13 PROCEDURE — 99213 OFFICE O/P EST LOW 20 MIN: CPT | Performed by: PHYSICIAN ASSISTANT

## 2024-03-13 PROCEDURE — 1123F ACP DISCUSS/DSCN MKR DOCD: CPT | Performed by: PHYSICIAN ASSISTANT

## 2024-03-13 PROCEDURE — 3074F SYST BP LT 130 MM HG: CPT | Performed by: PHYSICIAN ASSISTANT

## 2024-03-13 PROCEDURE — 3078F DIAST BP <80 MM HG: CPT | Performed by: PHYSICIAN ASSISTANT

## 2024-03-13 ASSESSMENT — ENCOUNTER SYMPTOMS
SHORTNESS OF BREATH: 0
COUGH: 0

## 2024-03-13 NOTE — PROGRESS NOTES
I have reviewed all needed documentation in preparation for visit. Verified patient by name and date of birth  Chief Complaint   Patient presents with    Other     Ear Cleaning-        Vitals:    03/13/24 1326   BP: 117/74   Site: Right Upper Arm   Position: Sitting   Cuff Size: Medium Adult   Pulse: 93   Resp: 18   Temp: 98.2 °F (36.8 °C)   TempSrc: Temporal   SpO2: 97%   Weight: 64.9 kg (143 lb)   Height: 1.626 m (5' 4\")       Health Maintenance Due   Topic Date Due    Shingles vaccine (1 of 2) Never done    Respiratory Syncytial Virus (RSV) Pregnant or age 60 yrs+ (1 - 1-dose 60+ series) Never done    COVID-19 Vaccine (5 - 2023-24 season) 09/01/2023    DTaP/Tdap/Td vaccine (2 - Td or Tdap) 01/01/2024    Annual Wellness Visit (Medicare Advantage)  Never done       1. \"Have you been to the ER, urgent care clinic since your last visit?  Hospitalized since your last visit?\" No    2. \"Have you seen or consulted any other health care providers outside of the [facility] since your last visit?\" No     3. For patients aged 45-75: Has the patient had a colonoscopy / FIT/ Cologuard? Yes - no Care Gap present      If the patient is female:    4. For patients aged 40-74: Has the patient had a mammogram within the past 2 years? Yes - no Care Gap present      5. For patients aged 21-65: Has the patient had a pap smear? NA - based on age or sex        Sandra lancaster CMA  
Pupils are equal, round, and reactive to light.   Neck:      Vascular: No carotid bruit.   Cardiovascular:      Rate and Rhythm: Normal rate and regular rhythm.      Heart sounds: Normal heart sounds.   Pulmonary:      Effort: Pulmonary effort is normal. No respiratory distress.      Breath sounds: Normal breath sounds.   Musculoskeletal:      Cervical back: Normal range of motion and neck supple. No rigidity.   Lymphadenopathy:      Cervical: No cervical adenopathy.   Skin:     General: Skin is warm and dry.      Findings: No rash.   Neurological:      General: No focal deficit present.      Mental Status: She is alert and oriented to person, place, and time.   Psychiatric:         Mood and Affect: Mood normal.         Behavior: Behavior normal.         Thought Content: Thought content normal.         Judgment: Judgment normal.       ASSESSMENT/PLAN    ICD-10-CM    1. Impacted cerumen of right ear  H61.21 REMOVAL IMPACTED CERUMEN IRRIGATION/LVG UNILAT      2. Allergic rhinitis, unspecified seasonality, unspecified trigger  J30.9        Pt tolerated procedure well and notes some relief.   Resume OTC allergy meds.

## 2024-03-13 NOTE — TELEPHONE ENCOUNTER
Called patient and lvm. Patient had a 2pm appt for ear cleaning that was made on 3/12/24 for 15 mins. Patient needs a 30 mins.     Called to inform her that the appt was change for 1:30pm and she will need to come in early.

## 2024-04-03 DIAGNOSIS — I10 ESSENTIAL HYPERTENSION: ICD-10-CM

## 2024-04-03 RX ORDER — LOSARTAN POTASSIUM 100 MG/1
100 TABLET ORAL DAILY
Qty: 90 TABLET | Refills: 1 | Status: SHIPPED | OUTPATIENT
Start: 2024-04-03

## 2024-04-12 RX ORDER — RISEDRONATE SODIUM 150 MG/1
TABLET, FILM COATED ORAL
Qty: 3 TABLET | Refills: 1 | Status: SHIPPED | OUTPATIENT
Start: 2024-04-12

## 2024-04-12 NOTE — TELEPHONE ENCOUNTER
Refill request received from [facility] for   Requested Prescriptions     Pending Prescriptions Disp Refills    Risedronate Sodium 150 MG TABS 3 tablet 1     Last office visit: 3/13/2024   Next office visit: 6/28/2024     Routed to BRIAN Duke for review.

## 2024-06-28 ENCOUNTER — OFFICE VISIT (OUTPATIENT)
Age: 69
End: 2024-06-28
Payer: MEDICARE

## 2024-06-28 VITALS
DIASTOLIC BLOOD PRESSURE: 88 MMHG | WEIGHT: 145 LBS | OXYGEN SATURATION: 98 % | HEIGHT: 64 IN | BODY MASS INDEX: 24.75 KG/M2 | SYSTOLIC BLOOD PRESSURE: 132 MMHG | HEART RATE: 83 BPM | RESPIRATION RATE: 16 BRPM | TEMPERATURE: 97.8 F

## 2024-06-28 DIAGNOSIS — E55.9 VITAMIN D DEFICIENCY, UNSPECIFIED: ICD-10-CM

## 2024-06-28 DIAGNOSIS — M54.50 ACUTE RIGHT-SIDED LOW BACK PAIN WITHOUT SCIATICA: ICD-10-CM

## 2024-06-28 DIAGNOSIS — I10 ESSENTIAL HYPERTENSION: ICD-10-CM

## 2024-06-28 DIAGNOSIS — E78.00 PURE HYPERCHOLESTEROLEMIA, UNSPECIFIED: ICD-10-CM

## 2024-06-28 DIAGNOSIS — Z00.00 MEDICARE ANNUAL WELLNESS VISIT, SUBSEQUENT: Primary | ICD-10-CM

## 2024-06-28 LAB
BILIRUBIN, URINE, POC: NEGATIVE
BLOOD URINE, POC: NEGATIVE
GLUCOSE URINE, POC: NEGATIVE
KETONES, URINE, POC: NEGATIVE
LEUKOCYTE ESTERASE, URINE, POC: NEGATIVE
NITRITE, URINE, POC: NEGATIVE
PH, URINE, POC: 6 (ref 4.6–8)
PROTEIN,URINE, POC: NEGATIVE
SPECIFIC GRAVITY, URINE, POC: 1.02 (ref 1–1.03)
URINALYSIS CLARITY, POC: CLEAR
URINALYSIS COLOR, POC: YELLOW
UROBILINOGEN, POC: NORMAL

## 2024-06-28 PROCEDURE — 81001 URINALYSIS AUTO W/SCOPE: CPT | Performed by: PHYSICIAN ASSISTANT

## 2024-06-28 PROCEDURE — G8427 DOCREV CUR MEDS BY ELIG CLIN: HCPCS | Performed by: PHYSICIAN ASSISTANT

## 2024-06-28 PROCEDURE — G0439 PPPS, SUBSEQ VISIT: HCPCS | Performed by: PHYSICIAN ASSISTANT

## 2024-06-28 PROCEDURE — G8420 CALC BMI NORM PARAMETERS: HCPCS | Performed by: PHYSICIAN ASSISTANT

## 2024-06-28 PROCEDURE — 3017F COLORECTAL CA SCREEN DOC REV: CPT | Performed by: PHYSICIAN ASSISTANT

## 2024-06-28 PROCEDURE — 99213 OFFICE O/P EST LOW 20 MIN: CPT | Performed by: PHYSICIAN ASSISTANT

## 2024-06-28 PROCEDURE — 1123F ACP DISCUSS/DSCN MKR DOCD: CPT | Performed by: PHYSICIAN ASSISTANT

## 2024-06-28 PROCEDURE — 1090F PRES/ABSN URINE INCON ASSESS: CPT | Performed by: PHYSICIAN ASSISTANT

## 2024-06-28 PROCEDURE — 3075F SYST BP GE 130 - 139MM HG: CPT | Performed by: PHYSICIAN ASSISTANT

## 2024-06-28 PROCEDURE — PBSHW AMB POC URINALYSIS DIP STICK AUTO W/ MICRO: Performed by: PHYSICIAN ASSISTANT

## 2024-06-28 PROCEDURE — G8399 PT W/DXA RESULTS DOCUMENT: HCPCS | Performed by: PHYSICIAN ASSISTANT

## 2024-06-28 PROCEDURE — 1036F TOBACCO NON-USER: CPT | Performed by: PHYSICIAN ASSISTANT

## 2024-06-28 PROCEDURE — 3079F DIAST BP 80-89 MM HG: CPT | Performed by: PHYSICIAN ASSISTANT

## 2024-06-28 RX ORDER — FEXOFENADINE HCL 180 MG/1
180 TABLET ORAL DAILY
COMMUNITY

## 2024-06-28 RX ORDER — BACLOFEN 10 MG/1
5-10 TABLET ORAL 3 TIMES DAILY
Qty: 30 TABLET | Refills: 0 | Status: SHIPPED | OUTPATIENT
Start: 2024-06-28

## 2024-06-28 RX ORDER — FLUTICASONE PROPIONATE 50 MCG
1 SPRAY, SUSPENSION (ML) NASAL DAILY
COMMUNITY

## 2024-06-28 RX ORDER — LOSARTAN POTASSIUM 100 MG/1
100 TABLET ORAL DAILY
Qty: 90 TABLET | Refills: 1 | Status: SHIPPED | OUTPATIENT
Start: 2024-06-28

## 2024-06-28 ASSESSMENT — PATIENT HEALTH QUESTIONNAIRE - PHQ9
SUM OF ALL RESPONSES TO PHQ QUESTIONS 1-9: 0
2. FEELING DOWN, DEPRESSED OR HOPELESS: NOT AT ALL
SUM OF ALL RESPONSES TO PHQ QUESTIONS 1-9: 0
SUM OF ALL RESPONSES TO PHQ9 QUESTIONS 1 & 2: 0
1. LITTLE INTEREST OR PLEASURE IN DOING THINGS: NOT AT ALL

## 2024-06-28 ASSESSMENT — LIFESTYLE VARIABLES: HOW OFTEN DO YOU HAVE A DRINK CONTAINING ALCOHOL: 2-3 TIMES A WEEK

## 2024-06-28 ASSESSMENT — ENCOUNTER SYMPTOMS
SHORTNESS OF BREATH: 0
COUGH: 0

## 2024-06-28 NOTE — PROGRESS NOTES
Fiorella Groves is a 68 y.o. female    Chief Complaint   Patient presents with    Medicare AWV       BP (!) 145/84   Pulse 83   Temp 97.8 °F (36.6 °C)   Resp 16   Ht 1.626 m (5' 4\")   Wt 65.8 kg (145 lb)   SpO2 98%   BMI 24.89 kg/m²         1. Have you been to the ER, urgent care clinic since your last visit?  Hospitalized since your last visit? No    2. Have you seen or consulted any other health care providers outside of the [facility] since your last visit?  Include any pap smears or colon screening. No    Learning Assessment:       No data to display                Fall Risk Assessment:      6/28/2024     8:59 AM 12/27/2023     3:38 PM 7/7/2022     1:04 PM   Amb Fall Risk Assessment and TUG Test   Do you feel unsteady or are you worried about falling?  no no    2 or more falls in past year? no no    Fall with injury in past year? no no    Fall in past 12 months?   0   Able to walk?   Yes       Abuse Screening:       No data to display                ADL Screening:       No data to display                
Fiorella Groves is a 68 y.o. female  Chief Complaint   Patient presents with    Medicare AWV     Vitals:    06/28/24 0908   BP: (!) 145/84   Pulse: 83   Resp: 16   Temp: 97.8 °F (36.6 °C)   SpO2: 98%      Wt Readings from Last 3 Encounters:   06/28/24 65.8 kg (145 lb)   03/13/24 64.9 kg (143 lb)   03/06/24 65.5 kg (144 lb 6.4 oz)     BMI Readings from Last 3 Encounters:   06/28/24 24.89 kg/m²   03/13/24 24.55 kg/m²   03/06/24 24.79 kg/m²     Health Maintenance Due   Topic Date Due    Shingles vaccine (1 of 2) Never done    Respiratory Syncytial Virus (RSV) Pregnant or age 60 yrs+ (1 - 1-dose 60+ series) Never done    COVID-19 Vaccine (5 - 2023-24 season) 09/01/2023    DTaP/Tdap/Td vaccine (2 - Td or Tdap) 01/01/2024    Annual Wellness Visit (Medicare)  Never done     Social History     Tobacco Use    Smoking status: Never    Smokeless tobacco: Never   Substance Use Topics    Alcohol use: Yes     Alcohol/week: 7.0 standard drinks of alcohol    ***  Family History   Problem Relation Age of Onset    Hypertension Father     Heart Disease Father     Broken Bones Mother         Hip and numerus other bones    Heart Disease Mother         CHF    Diabetes Mother     Heart Disease Brother         severe aortic stenosis    Hypertension Brother     Heart Disease Brother     Heart Disease Paternal Uncle     ***    HPI  Pt presents for an Annual Physical.   ***  Wt Readings from Last 3 Encounters:   06/28/24 65.8 kg (145 lb)   03/13/24 64.9 kg (143 lb)   03/06/24 65.5 kg (144 lb 6.4 oz)       Review of Systems      Physical Exam    ASSESSMENT/PLAN  No diagnosis found.     
Fiorella Groves is a 68 y.o. female  Chief Complaint   Patient presents with    Medicare AWV     Vitals:    06/28/24 0925   BP: 132/88   Pulse:    Resp:    Temp:    SpO2:       Wt Readings from Last 3 Encounters:   06/28/24 65.8 kg (145 lb)   03/13/24 64.9 kg (143 lb)   03/06/24 65.5 kg (144 lb 6.4 oz)     BMI Readings from Last 3 Encounters:   06/28/24 24.89 kg/m²   03/13/24 24.55 kg/m²   03/06/24 24.79 kg/m²     Health Maintenance Due   Topic Date Due    Shingles vaccine (1 of 2) Never done    Respiratory Syncytial Virus (RSV) Pregnant or age 60 yrs+ (1 - 1-dose 60+ series) Never done    COVID-19 Vaccine (5 - 2023-24 season) 09/01/2023    DTaP/Tdap/Td vaccine (2 - Td or Tdap) 01/01/2024     HPI  Strained back moving furniture 2 weeks ago. Now has vague pain radiating around R lower back to pelvis area. Would like UA to be sure she doesn't have a UTI.   Tried Lidocaine patch, heat, motrin    CV follow up  BP controlled:  BP Readings from Last 3 Encounters:   06/28/24 132/88   03/13/24 117/74   03/06/24 138/83     Currently taking:   Hypertension Medications       Angiotensin II Receptor Antagonists       losartan (COZAAR) 100 MG tablet TAKE 1 TABLET DAILY            Creatinine (mg/dL)   Date Value   03/06/2024 0.69      Cholesterol Meds  This patient does not have an active medication from one of the medication groupers.  Lab Results   Component Value Date     (H) 03/06/2024     LDL uncontrolled at last check.     ROS  Review of Systems   Constitutional:  Negative for fever.   Respiratory:  Negative for cough and shortness of breath.    Cardiovascular:  Negative for chest pain and palpitations.   Genitourinary:  Negative for difficulty urinating and dysuria.   Neurological:  Negative for headaches.   Psychiatric/Behavioral:  Negative for dysphoric mood.      EXAM  Physical Exam  Vitals and nursing note reviewed.   Constitutional:       Appearance: Normal appearance.   HENT:      Head: Normocephalic and 
mouth daily Yes Stefania Mcqueen PA-C   Cholecalciferol (VITAMIN D3) 125 MCG (5000 UT) CAPS Take 1 capsule by mouth daily Yes Stefania Mcqueen PA-C   tretinoin (RETIN-A) 0.05 % cream PLEASE SEE ATTACHED FOR DETAILED DIRECTIONS Yes Provider, MD Kelsie       CareTeam (Including outside providers/suppliers regularly involved in providing care):   Patient Care Team:  Stefania cMqueen PA-C as PCP - General (Physician Assistant)  Stefania Mcqueen PA-C as PCP - Empaneled Provider     Reviewed and updated this visit:  Tobacco  Allergies  Meds  Problems  Med Hx  Surg Hx  Soc Hx  Fam Hx

## 2024-09-30 RX ORDER — RISEDRONATE SODIUM 150 MG/1
TABLET, FILM COATED ORAL
Qty: 3 TABLET | Refills: 3 | Status: SHIPPED | OUTPATIENT
Start: 2024-09-30

## 2024-12-27 DIAGNOSIS — I10 ESSENTIAL HYPERTENSION: ICD-10-CM

## 2024-12-27 NOTE — TELEPHONE ENCOUNTER
Refill request received from [facility]     for   Requested Prescriptions     Pending Prescriptions Disp Refills    losartan (COZAAR) 100 MG tablet [Pharmacy Med Name: LOSARTAN TABS 100MG] 90 tablet 3     Sig: TAKE 1 TABLET DAILY     Last office visit: 6/28/2024   Next office visit: Visit date not found     Routed to BRIAN Duke for review.     Anneliese Cisenros LPN

## 2024-12-29 RX ORDER — LOSARTAN POTASSIUM 100 MG/1
100 TABLET ORAL DAILY
Qty: 90 TABLET | Refills: 0 | Status: SHIPPED | OUTPATIENT
Start: 2024-12-29

## 2025-01-22 ENCOUNTER — TRANSCRIBE ORDERS (OUTPATIENT)
Facility: HOSPITAL | Age: 70
End: 2025-01-22

## 2025-01-22 DIAGNOSIS — Z12.31 ENCOUNTER FOR SCREENING MAMMOGRAM FOR MALIGNANT NEOPLASM OF BREAST: Primary | ICD-10-CM

## 2025-01-30 ENCOUNTER — HOSPITAL ENCOUNTER (OUTPATIENT)
Facility: HOSPITAL | Age: 70
Discharge: HOME | End: 2025-01-30
Payer: MEDICARE

## 2025-01-30 VITALS — BODY MASS INDEX: 26.29 KG/M2 | WEIGHT: 154 LBS | HEIGHT: 64 IN

## 2025-01-30 DIAGNOSIS — Z12.31 ENCOUNTER FOR SCREENING MAMMOGRAM FOR MALIGNANT NEOPLASM OF BREAST: ICD-10-CM

## 2025-01-30 PROCEDURE — 77063 BREAST TOMOSYNTHESIS BI: CPT

## 2025-02-10 ENCOUNTER — TELEPHONE (OUTPATIENT)
Age: 70
End: 2025-02-10

## 2025-02-10 NOTE — TELEPHONE ENCOUNTER
Patient was scheduled for a 15 minute OV on 02/17/25 at 11:30 AM. Patient requires 30 minute appt due to age. Left vm and sent [employer] message informing patient that this appointment was scheduled incorrectly and has been cancelled. Left number for patient to return call to reschedule. Patient VT modifiers updated.

## 2025-03-04 ENCOUNTER — OFFICE VISIT (OUTPATIENT)
Age: 70
End: 2025-03-04
Payer: MEDICARE

## 2025-03-04 VITALS
RESPIRATION RATE: 18 BRPM | DIASTOLIC BLOOD PRESSURE: 70 MMHG | HEART RATE: 100 BPM | BODY MASS INDEX: 25.27 KG/M2 | OXYGEN SATURATION: 98 % | SYSTOLIC BLOOD PRESSURE: 113 MMHG | HEIGHT: 64 IN | WEIGHT: 148 LBS | TEMPERATURE: 97.8 F

## 2025-03-04 DIAGNOSIS — J30.9 ALLERGIC RHINITIS, UNSPECIFIED SEASONALITY, UNSPECIFIED TRIGGER: Primary | ICD-10-CM

## 2025-03-04 DIAGNOSIS — M81.0 AGE-RELATED OSTEOPOROSIS WITHOUT CURRENT PATHOLOGICAL FRACTURE: ICD-10-CM

## 2025-03-04 DIAGNOSIS — E78.00 PURE HYPERCHOLESTEROLEMIA, UNSPECIFIED: ICD-10-CM

## 2025-03-04 DIAGNOSIS — I10 ESSENTIAL HYPERTENSION: ICD-10-CM

## 2025-03-04 PROCEDURE — G8427 DOCREV CUR MEDS BY ELIG CLIN: HCPCS | Performed by: PHYSICIAN ASSISTANT

## 2025-03-04 PROCEDURE — 1126F AMNT PAIN NOTED NONE PRSNT: CPT | Performed by: PHYSICIAN ASSISTANT

## 2025-03-04 PROCEDURE — 1090F PRES/ABSN URINE INCON ASSESS: CPT | Performed by: PHYSICIAN ASSISTANT

## 2025-03-04 PROCEDURE — 1036F TOBACCO NON-USER: CPT | Performed by: PHYSICIAN ASSISTANT

## 2025-03-04 PROCEDURE — 99214 OFFICE O/P EST MOD 30 MIN: CPT | Performed by: PHYSICIAN ASSISTANT

## 2025-03-04 PROCEDURE — 1160F RVW MEDS BY RX/DR IN RCRD: CPT | Performed by: PHYSICIAN ASSISTANT

## 2025-03-04 PROCEDURE — 3017F COLORECTAL CA SCREEN DOC REV: CPT | Performed by: PHYSICIAN ASSISTANT

## 2025-03-04 PROCEDURE — 1159F MED LIST DOCD IN RCRD: CPT | Performed by: PHYSICIAN ASSISTANT

## 2025-03-04 PROCEDURE — 3078F DIAST BP <80 MM HG: CPT | Performed by: PHYSICIAN ASSISTANT

## 2025-03-04 PROCEDURE — 1123F ACP DISCUSS/DSCN MKR DOCD: CPT | Performed by: PHYSICIAN ASSISTANT

## 2025-03-04 PROCEDURE — G2211 COMPLEX E/M VISIT ADD ON: HCPCS | Performed by: PHYSICIAN ASSISTANT

## 2025-03-04 PROCEDURE — 3074F SYST BP LT 130 MM HG: CPT | Performed by: PHYSICIAN ASSISTANT

## 2025-03-04 PROCEDURE — G8419 CALC BMI OUT NRM PARAM NOF/U: HCPCS | Performed by: PHYSICIAN ASSISTANT

## 2025-03-04 PROCEDURE — G8399 PT W/DXA RESULTS DOCUMENT: HCPCS | Performed by: PHYSICIAN ASSISTANT

## 2025-03-04 RX ORDER — ROSUVASTATIN CALCIUM 10 MG/1
10 TABLET, COATED ORAL DAILY
Qty: 90 TABLET | Refills: 1 | Status: SHIPPED | OUTPATIENT
Start: 2025-03-04

## 2025-03-04 RX ORDER — LOSARTAN POTASSIUM 100 MG/1
100 TABLET ORAL DAILY
Qty: 90 TABLET | Refills: 1 | Status: SHIPPED | OUTPATIENT
Start: 2025-03-04

## 2025-03-04 RX ORDER — RISEDRONATE SODIUM 150 MG/1
TABLET, FILM COATED ORAL
Qty: 3 TABLET | Refills: 3 | Status: SHIPPED | OUTPATIENT
Start: 2025-03-04

## 2025-03-04 SDOH — ECONOMIC STABILITY: FOOD INSECURITY: WITHIN THE PAST 12 MONTHS, YOU WORRIED THAT YOUR FOOD WOULD RUN OUT BEFORE YOU GOT MONEY TO BUY MORE.: PATIENT DECLINED

## 2025-03-04 SDOH — ECONOMIC STABILITY: FOOD INSECURITY: WITHIN THE PAST 12 MONTHS, THE FOOD YOU BOUGHT JUST DIDN'T LAST AND YOU DIDN'T HAVE MONEY TO GET MORE.: PATIENT DECLINED

## 2025-03-04 ASSESSMENT — ENCOUNTER SYMPTOMS
SHORTNESS OF BREATH: 0
COUGH: 0

## 2025-03-04 ASSESSMENT — PATIENT HEALTH QUESTIONNAIRE - PHQ9
2. FEELING DOWN, DEPRESSED OR HOPELESS: NOT AT ALL
SUM OF ALL RESPONSES TO PHQ QUESTIONS 1-9: 0
1. LITTLE INTEREST OR PLEASURE IN DOING THINGS: NOT AT ALL

## 2025-03-04 NOTE — PROGRESS NOTES
I have reviewed all needed documentation in preparation for visit. Verified patient by name and date of birth  Chief Complaint   Patient presents with    Medication Refill     No concerns     Follow-up       Vitals:    03/04/25 1340   BP: 113/70   Site: Right Upper Arm   Position: Sitting   Cuff Size: Medium Adult   Pulse: 100   Resp: 18   Temp: 97.8 °F (36.6 °C)   TempSrc: Temporal   SpO2: 98%   Weight: 67.1 kg (148 lb)   Height: 1.626 m (5' 4\")       Health Maintenance Due   Topic Date Due    Diabetes screen  Never done    Shingles vaccine (1 of 2) Never done    DTaP/Tdap/Td vaccine (2 - Td or Tdap) 01/01/2024    Flu vaccine (1) 08/01/2024    COVID-19 Vaccine (5 - 2024-25 season) 09/01/2024     \"Have you been to the ER, urgent care clinic since your last visit?  Hospitalized since your last visit?\"    NO    “Have you seen or consulted any other health care providers outside of [facility] since your last visit?”    NO            Click Here for Release of Records Request         Sandra lancaster CMA  
appearance.   HENT:      Head: Normocephalic and atraumatic.   Neck:      Vascular: No carotid bruit.   Cardiovascular:      Rate and Rhythm: Normal rate and regular rhythm.      Heart sounds: Normal heart sounds.   Pulmonary:      Effort: Pulmonary effort is normal. No respiratory distress.      Breath sounds: Normal breath sounds.   Musculoskeletal:      Cervical back: Neck supple.   Skin:     General: Skin is warm and dry.   Neurological:      General: No focal deficit present.      Mental Status: She is alert and oriented to person, place, and time.   Psychiatric:         Mood and Affect: Mood normal.         Behavior: Behavior normal.         Thought Content: Thought content normal.         Judgment: Judgment normal.       ASSESSMENT/PLAN  Allergic rhinitis, unspecified seasonality, unspecified trigger  Essential hypertension  -     losartan (COZAAR) 100 MG tablet; Take 1 tablet by mouth daily, Disp-90 tablet, R-1Normal  Age-related osteoporosis without current pathological fracture  -     Risedronate Sodium 150 MG TABS; TAKE 1 TABLET EVERY 30 DAYS, Disp-3 tablet, R-3Normal  -     Vitamin D 25 Hydroxy; Future  Pure hypercholesterolemia, unspecified  -     Comprehensive Metabolic Panel; Future  -     rosuvastatin (CRESTOR) 10 MG tablet; Take 1 tablet by mouth daily, Disp-90 tablet, R-1Normal  -     Lipid Panel; Future     Refills Sent.

## 2025-07-03 SDOH — HEALTH STABILITY: PHYSICAL HEALTH: ON AVERAGE, HOW MANY MINUTES DO YOU ENGAGE IN EXERCISE AT THIS LEVEL?: 60 MIN

## 2025-07-03 SDOH — HEALTH STABILITY: PHYSICAL HEALTH: ON AVERAGE, HOW MANY DAYS PER WEEK DO YOU ENGAGE IN MODERATE TO STRENUOUS EXERCISE (LIKE A BRISK WALK)?: 5 DAYS

## 2025-07-03 ASSESSMENT — PATIENT HEALTH QUESTIONNAIRE - PHQ9
SUM OF ALL RESPONSES TO PHQ QUESTIONS 1-9: 0
2. FEELING DOWN, DEPRESSED OR HOPELESS: NOT AT ALL
SUM OF ALL RESPONSES TO PHQ QUESTIONS 1-9: 0
1. LITTLE INTEREST OR PLEASURE IN DOING THINGS: NOT AT ALL

## 2025-07-03 ASSESSMENT — LIFESTYLE VARIABLES
HOW OFTEN DURING THE LAST YEAR HAVE YOU FAILED TO DO WHAT WAS NORMALLY EXPECTED FROM YOU BECAUSE OF DRINKING: NEVER
HAS A RELATIVE, FRIEND, DOCTOR, OR ANOTHER HEALTH PROFESSIONAL EXPRESSED CONCERN ABOUT YOUR DRINKING OR SUGGESTED YOU CUT DOWN: NO
HOW MANY STANDARD DRINKS CONTAINING ALCOHOL DO YOU HAVE ON A TYPICAL DAY: 1 OR 2
HAVE YOU OR SOMEONE ELSE BEEN INJURED AS A RESULT OF YOUR DRINKING: NO
HOW OFTEN DURING THE LAST YEAR HAVE YOU NEEDED AN ALCOHOLIC DRINK FIRST THING IN THE MORNING TO GET YOURSELF GOING AFTER A NIGHT OF HEAVY DRINKING: NEVER
HOW OFTEN DURING THE LAST YEAR HAVE YOU BEEN UNABLE TO REMEMBER WHAT HAPPENED THE NIGHT BEFORE BECAUSE YOU HAD BEEN DRINKING: NEVER
HOW OFTEN DO YOU HAVE A DRINK CONTAINING ALCOHOL: 2-3 TIMES A WEEK
HOW OFTEN DURING THE LAST YEAR HAVE YOU FOUND THAT YOU WERE NOT ABLE TO STOP DRINKING ONCE YOU HAD STARTED: NEVER
HOW OFTEN DURING THE LAST YEAR HAVE YOU HAD A FEELING OF GUILT OR REMORSE AFTER DRINKING: NEVER

## 2025-07-08 ENCOUNTER — RESULTS FOLLOW-UP (OUTPATIENT)
Age: 70
End: 2025-07-08

## 2025-07-08 LAB
25(OH)D3+25(OH)D2 SERPL-MCNC: 38.6 NG/ML (ref 30–100)
ALBUMIN SERPL-MCNC: 4.3 G/DL (ref 3.9–4.9)
ALP SERPL-CCNC: 43 IU/L (ref 44–121)
ALT SERPL-CCNC: 13 IU/L (ref 0–32)
AST SERPL-CCNC: 16 IU/L (ref 0–40)
BILIRUB SERPL-MCNC: 0.3 MG/DL (ref 0–1.2)
BUN SERPL-MCNC: 14 MG/DL (ref 8–27)
BUN/CREAT SERPL: 19 (ref 12–28)
CALCIUM SERPL-MCNC: 9.1 MG/DL (ref 8.7–10.3)
CHLORIDE SERPL-SCNC: 104 MMOL/L (ref 96–106)
CHOLEST SERPL-MCNC: 171 MG/DL (ref 100–199)
CO2 SERPL-SCNC: 23 MMOL/L (ref 20–29)
CREAT SERPL-MCNC: 0.75 MG/DL (ref 0.57–1)
EGFRCR SERPLBLD CKD-EPI 2021: 86 ML/MIN/1.73
GLOBULIN SER CALC-MCNC: 2 G/DL (ref 1.5–4.5)
GLUCOSE SERPL-MCNC: 100 MG/DL (ref 70–99)
HDLC SERPL-MCNC: 81 MG/DL
IMP & REVIEW OF LAB RESULTS: NORMAL
LDLC SERPL CALC-MCNC: 72 MG/DL (ref 0–99)
POTASSIUM SERPL-SCNC: 4.6 MMOL/L (ref 3.5–5.2)
PROT SERPL-MCNC: 6.3 G/DL (ref 6–8.5)
SODIUM SERPL-SCNC: 141 MMOL/L (ref 134–144)
TRIGL SERPL-MCNC: 105 MG/DL (ref 0–149)
VLDLC SERPL CALC-MCNC: 18 MG/DL (ref 5–40)

## 2025-07-08 ASSESSMENT — LIFESTYLE VARIABLES
HOW OFTEN DURING THE LAST YEAR HAVE YOU FOUND THAT YOU WERE NOT ABLE TO STOP DRINKING ONCE YOU HAD STARTED: NEVER
HOW OFTEN DURING THE LAST YEAR HAVE YOU HAD A FEELING OF GUILT OR REMORSE AFTER DRINKING: NEVER
HAVE YOU OR SOMEONE ELSE BEEN INJURED AS A RESULT OF YOUR DRINKING: NO
HOW OFTEN DO YOU HAVE A DRINK CONTAINING ALCOHOL: 4
HOW OFTEN DURING THE LAST YEAR HAVE YOU FAILED TO DO WHAT WAS NORMALLY EXPECTED FROM YOU BECAUSE OF DRINKING: NEVER
HOW OFTEN DURING THE LAST YEAR HAVE YOU NEEDED AN ALCOHOLIC DRINK FIRST THING IN THE MORNING TO GET YOURSELF GOING AFTER A NIGHT OF HEAVY DRINKING: NEVER
HOW OFTEN DO YOU HAVE SIX OR MORE DRINKS ON ONE OCCASION: 2
HOW OFTEN DURING THE LAST YEAR HAVE YOU BEEN UNABLE TO REMEMBER WHAT HAPPENED THE NIGHT BEFORE BECAUSE YOU HAD BEEN DRINKING: NEVER
HAS A RELATIVE, FRIEND, DOCTOR, OR ANOTHER HEALTH PROFESSIONAL EXPRESSED CONCERN ABOUT YOUR DRINKING OR SUGGESTED YOU CUT DOWN: NO
HOW MANY STANDARD DRINKS CONTAINING ALCOHOL DO YOU HAVE ON A TYPICAL DAY: 1

## 2025-07-10 ENCOUNTER — TELEPHONE (OUTPATIENT)
Age: 70
End: 2025-07-10

## 2025-07-10 NOTE — TELEPHONE ENCOUNTER
Attempted to contact patient regarding upcoming Medicare wellness appointment and completion of HRA questionnaire. LVM for patient to please return call at  [phone].

## 2025-07-14 ENCOUNTER — OFFICE VISIT (OUTPATIENT)
Age: 70
End: 2025-07-14
Payer: MEDICARE

## 2025-07-14 VITALS
DIASTOLIC BLOOD PRESSURE: 72 MMHG | OXYGEN SATURATION: 96 % | WEIGHT: 145 LBS | SYSTOLIC BLOOD PRESSURE: 120 MMHG | BODY MASS INDEX: 24.75 KG/M2 | TEMPERATURE: 97.5 F | HEIGHT: 64 IN | HEART RATE: 86 BPM | RESPIRATION RATE: 17 BRPM

## 2025-07-14 DIAGNOSIS — E78.00 PURE HYPERCHOLESTEROLEMIA, UNSPECIFIED: ICD-10-CM

## 2025-07-14 DIAGNOSIS — Z00.00 MEDICARE ANNUAL WELLNESS VISIT, SUBSEQUENT: Primary | ICD-10-CM

## 2025-07-14 DIAGNOSIS — I10 ESSENTIAL HYPERTENSION: ICD-10-CM

## 2025-07-14 PROCEDURE — G8399 PT W/DXA RESULTS DOCUMENT: HCPCS | Performed by: PHYSICIAN ASSISTANT

## 2025-07-14 PROCEDURE — 1159F MED LIST DOCD IN RCRD: CPT | Performed by: PHYSICIAN ASSISTANT

## 2025-07-14 PROCEDURE — G8420 CALC BMI NORM PARAMETERS: HCPCS | Performed by: PHYSICIAN ASSISTANT

## 2025-07-14 PROCEDURE — 1123F ACP DISCUSS/DSCN MKR DOCD: CPT | Performed by: PHYSICIAN ASSISTANT

## 2025-07-14 PROCEDURE — G0439 PPPS, SUBSEQ VISIT: HCPCS | Performed by: PHYSICIAN ASSISTANT

## 2025-07-14 PROCEDURE — 3017F COLORECTAL CA SCREEN DOC REV: CPT | Performed by: PHYSICIAN ASSISTANT

## 2025-07-14 PROCEDURE — 1126F AMNT PAIN NOTED NONE PRSNT: CPT | Performed by: PHYSICIAN ASSISTANT

## 2025-07-14 PROCEDURE — 3078F DIAST BP <80 MM HG: CPT | Performed by: PHYSICIAN ASSISTANT

## 2025-07-14 PROCEDURE — 99213 OFFICE O/P EST LOW 20 MIN: CPT | Performed by: PHYSICIAN ASSISTANT

## 2025-07-14 PROCEDURE — 1090F PRES/ABSN URINE INCON ASSESS: CPT | Performed by: PHYSICIAN ASSISTANT

## 2025-07-14 PROCEDURE — 1036F TOBACCO NON-USER: CPT | Performed by: PHYSICIAN ASSISTANT

## 2025-07-14 PROCEDURE — 3074F SYST BP LT 130 MM HG: CPT | Performed by: PHYSICIAN ASSISTANT

## 2025-07-14 PROCEDURE — 1160F RVW MEDS BY RX/DR IN RCRD: CPT | Performed by: PHYSICIAN ASSISTANT

## 2025-07-14 PROCEDURE — G8427 DOCREV CUR MEDS BY ELIG CLIN: HCPCS | Performed by: PHYSICIAN ASSISTANT

## 2025-07-14 RX ORDER — ROSUVASTATIN CALCIUM 10 MG/1
10 TABLET, COATED ORAL DAILY
Qty: 90 TABLET | Refills: 1 | Status: SHIPPED | OUTPATIENT
Start: 2025-07-14

## 2025-07-14 RX ORDER — LOSARTAN POTASSIUM 100 MG/1
100 TABLET ORAL DAILY
Qty: 90 TABLET | Refills: 1 | Status: SHIPPED | OUTPATIENT
Start: 2025-07-14

## 2025-07-14 ASSESSMENT — ENCOUNTER SYMPTOMS
COUGH: 0
SHORTNESS OF BREATH: 0

## 2025-07-14 NOTE — PROGRESS NOTES
Fiorella Groves is a 70 y.o. female  Chief Complaint   Patient presents with    Medicare AWV     No concerns      Vitals:    07/14/25 0941 07/14/25 1001   BP: (!) 141/85 120/72   BP Site: Left Upper Arm    Patient Position: Sitting    BP Cuff Size: Medium Adult    Pulse: 86    Resp: 17    Temp: 97.5 °F (36.4 °C)    TempSrc: Temporal    SpO2: 96%    Weight: 65.8 kg (145 lb)    Height: 1.629 m (5' 4.13\")       Wt Readings from Last 3 Encounters:   07/14/25 65.8 kg (145 lb)   03/04/25 67.1 kg (148 lb)   01/30/25 69.9 kg (154 lb)     BMI Readings from Last 3 Encounters:   07/14/25 24.79 kg/m²   03/04/25 25.40 kg/m²   01/30/25 26.43 kg/m²     Health Maintenance Due   Topic Date Due    Shingles vaccine (1 of 2) Never done    DTaP/Tdap/Td vaccine (2 - Td or Tdap) 01/01/2024    COVID-19 Vaccine (5 - 2024-25 season) 09/01/2024     HPI  CV follow up  BP controlled at recheck today:  BP Readings from Last 3 Encounters:   07/14/25 120/72   03/04/25 113/70   06/28/24 132/88     Currently taking:   Hypertension Medications       Angiotensin II Receptor Antagonists       losartan (COZAAR) 100 MG tablet Take 1 tablet by mouth daily            Creatinine (mg/dL)   Date Value   07/07/2025 0.75      Cholesterol Meds  rosuvastatin - 10 MG  Lab Results   Component Value Date    LDL 72 07/07/2025     LDL controlled at last check.     ROS  Review of Systems   Constitutional:  Negative for fever.   Respiratory:  Negative for cough and shortness of breath.    Cardiovascular:  Negative for chest pain and palpitations.   Neurological:  Negative for headaches.   Psychiatric/Behavioral:  Negative for dysphoric mood.      EXAM  Physical Exam  Vitals and nursing note reviewed.   Constitutional:       Appearance: Normal appearance.   HENT:      Head: Normocephalic and atraumatic.   Neck:      Vascular: No carotid bruit.   Cardiovascular:      Rate and Rhythm: Normal rate and regular rhythm.      Heart sounds: Normal heart sounds.   Pulmonary:

## 2025-07-14 NOTE — PROGRESS NOTES
Medicare Annual Wellness Visit    Fiorella Groves is here for Medicare AWV (No concerns )    Assessment & Plan   Medicare annual wellness visit, subsequent     No follow-ups on file.     Subjective     Patient's complete Health Risk Assessment and screening values have been reviewed and are found in Flowsheets. The following problems were reviewed today and where indicated follow up appointments were made and/or referrals ordered.    Positive Risk Factor Screenings with Interventions:       Alcohol Screening:  AUDIT-C Score: 4  AUDIT Total Score: 4  Total Score Interpretation: 0-7 suggests low risk alcohol consumption but assess individual risks   Interventions:  Low risk             Poor Eating Habits/Diet:  Do you eat balanced/healthy meals regularly?: (!) No  Interventions:  Patient declines any further evaluation or treatment       Vision Screen:  Do you have difficulty driving, watching TV, or doing any of your daily activities because of your eyesight?: No  Have you had an eye exam within the past year?: (!) No  Interventions:   Patient encouraged to make appointment with their eye specialist               Objective   Vitals:    07/14/25 0941   BP: (!) 141/85   BP Site: Left Upper Arm   Patient Position: Sitting   BP Cuff Size: Medium Adult   Pulse: 86   Resp: 17   Temp: 97.5 °F (36.4 °C)   TempSrc: Temporal   SpO2: 96%   Weight: 65.8 kg (145 lb)   Height: 1.629 m (5' 4.13\")      Body mass index is 24.79 kg/m².                  No Known Allergies  Prior to Visit Medications    Medication Sig Taking? Authorizing Provider   losartan (COZAAR) 100 MG tablet Take 1 tablet by mouth daily Yes Stefania Mcqueen PA-C   Risedronate Sodium 150 MG TABS TAKE 1 TABLET EVERY 30 DAYS Yes Stefania Mcqueen PA-C   rosuvastatin (CRESTOR) 10 MG tablet Take 1 tablet by mouth daily Yes Stefania Mcqueen PA-C   fluticasone (FLONASE) 50 MCG/ACT nasal spray 1 spray by Each Nostril route daily Yes Provider, MD Kelsie   fexofenadine

## 2025-07-14 NOTE — PROGRESS NOTES
intersecting Shapes Test    Patient was asked to copy a drawing of Intersecting Pentagons (an alternative cognitive screen to the clock drawing test)    To get a score of 1, there had to be two figures that seem to intersect. At least one of the figures had to have five angles. Tremor, rotation, relative size, and symmetry in the drawing were ignored.    Result: 1/1    References:  https://pmc.ncbi.nlm.nih.gov/articles/RTP0491171/  https://www.sciencedirect.com/science/article/pii/J5773145208192449

## 2025-07-14 NOTE — PROGRESS NOTES
I have reviewed all needed documentation in preparation for visit. Verified patient by name and date of birth  Chief Complaint   Patient presents with    Medicare AWV     No concerns        Vitals:    07/14/25 0941   BP: (!) 141/85   BP Site: Left Upper Arm   Patient Position: Sitting   BP Cuff Size: Medium Adult   Pulse: 86   Resp: 17   Temp: 97.5 °F (36.4 °C)   TempSrc: Temporal   SpO2: 96%   Weight: 65.8 kg (145 lb)   Height: 1.629 m (5' 4.13\")       Health Maintenance Due   Topic Date Due    Shingles vaccine (1 of 2) Never done    DTaP/Tdap/Td vaccine (2 - Td or Tdap) 01/01/2024    COVID-19 Vaccine (5 - 2024-25 season) 09/01/2024    Annual Wellness Visit (Medicare)  06/29/2025     \"Have you been to the ER, urgent care clinic since your last visit?  Hospitalized since your last visit?\"    YES [facility] clinic     “Have you seen or consulted any other health care providers outside of [facility] since your last visit?”    YES            Click Here for Release of Records Request         Sandra lancaster [facility]

## 2025-09-03 ENCOUNTER — TELEPHONE (OUTPATIENT)
Age: 70
End: 2025-09-03